# Patient Record
Sex: FEMALE | Race: WHITE | Employment: OTHER | URBAN - METROPOLITAN AREA
[De-identification: names, ages, dates, MRNs, and addresses within clinical notes are randomized per-mention and may not be internally consistent; named-entity substitution may affect disease eponyms.]

---

## 2017-01-09 ENCOUNTER — OFFICE VISIT - RIVER FALLS (OUTPATIENT)
Dept: FAMILY MEDICINE | Facility: CLINIC | Age: 67
End: 2017-01-09

## 2017-01-09 ASSESSMENT — MIFFLIN-ST. JEOR: SCORE: 1519.01

## 2017-03-08 ENCOUNTER — OFFICE VISIT - RIVER FALLS (OUTPATIENT)
Dept: FAMILY MEDICINE | Facility: CLINIC | Age: 67
End: 2017-03-08

## 2017-03-08 ASSESSMENT — MIFFLIN-ST. JEOR: SCORE: 1532.61

## 2017-03-27 ENCOUNTER — OFFICE VISIT - RIVER FALLS (OUTPATIENT)
Dept: FAMILY MEDICINE | Facility: CLINIC | Age: 67
End: 2017-03-27

## 2017-06-05 ENCOUNTER — COMMUNICATION - RIVER FALLS (OUTPATIENT)
Dept: FAMILY MEDICINE | Facility: CLINIC | Age: 67
End: 2017-06-05

## 2017-06-05 ENCOUNTER — OFFICE VISIT - RIVER FALLS (OUTPATIENT)
Dept: FAMILY MEDICINE | Facility: CLINIC | Age: 67
End: 2017-06-05

## 2017-06-05 ASSESSMENT — MIFFLIN-ST. JEOR: SCORE: 1551.66

## 2017-06-06 LAB
CREAT SERPL-MCNC: 0.85 MG/DL (ref 0.5–0.99)
GLUCOSE BLD-MCNC: 112 MG/DL (ref 65–99)

## 2017-10-19 ENCOUNTER — COMMUNICATION - RIVER FALLS (OUTPATIENT)
Dept: FAMILY MEDICINE | Facility: CLINIC | Age: 67
End: 2017-10-19

## 2018-01-15 ENCOUNTER — OFFICE VISIT - RIVER FALLS (OUTPATIENT)
Dept: FAMILY MEDICINE | Facility: CLINIC | Age: 68
End: 2018-01-15

## 2018-01-15 ASSESSMENT — MIFFLIN-ST. JEOR: SCORE: 1518.1

## 2018-01-16 LAB
CHOLEST SERPL-MCNC: 229 MG/DL
CHOLEST/HDLC SERPL: 4.4 {RATIO}
CREAT SERPL-MCNC: 0.66 MG/DL (ref 0.5–0.99)
GLUCOSE BLD-MCNC: 110 MG/DL (ref 65–99)
HDLC SERPL-MCNC: 52 MG/DL
LDLC SERPL CALC-MCNC: 150 MG/DL
NONHDLC SERPL-MCNC: 177 MG/DL
TRIGL SERPL-MCNC: 142 MG/DL

## 2018-05-07 ENCOUNTER — OFFICE VISIT - RIVER FALLS (OUTPATIENT)
Dept: FAMILY MEDICINE | Facility: CLINIC | Age: 68
End: 2018-05-07

## 2018-05-07 ASSESSMENT — MIFFLIN-ST. JEOR: SCORE: 1477.27

## 2018-09-04 ENCOUNTER — OFFICE VISIT - RIVER FALLS (OUTPATIENT)
Dept: FAMILY MEDICINE | Facility: CLINIC | Age: 68
End: 2018-09-04

## 2018-09-10 ENCOUNTER — OFFICE VISIT - RIVER FALLS (OUTPATIENT)
Dept: FAMILY MEDICINE | Facility: CLINIC | Age: 68
End: 2018-09-10

## 2018-09-10 ASSESSMENT — MIFFLIN-ST. JEOR: SCORE: 1503.58

## 2018-10-03 ENCOUNTER — OFFICE VISIT - RIVER FALLS (OUTPATIENT)
Dept: FAMILY MEDICINE | Facility: CLINIC | Age: 68
End: 2018-10-03

## 2018-10-03 ENCOUNTER — TRANSFERRED RECORDS (OUTPATIENT)
Dept: MULTI SPECIALTY CLINIC | Facility: CLINIC | Age: 68
End: 2018-10-03

## 2018-10-03 ASSESSMENT — MIFFLIN-ST. JEOR: SCORE: 1500.52

## 2018-10-04 LAB
CHOLEST SERPL-MCNC: 246 MG/DL
CHOLEST/HDLC SERPL: 4.3 {RATIO}
CREAT SERPL-MCNC: 0.65 MG/DL (ref 0.5–0.99)
GLUCOSE BLD-MCNC: 95 MG/DL (ref 65–99)
HDLC SERPL-MCNC: 57 MG/DL
LDLC SERPL CALC-MCNC: 160 MG/DL
NONHDLC SERPL-MCNC: 189 MG/DL
TRIGL SERPL-MCNC: 158 MG/DL

## 2018-10-16 ENCOUNTER — OFFICE VISIT - RIVER FALLS (OUTPATIENT)
Dept: FAMILY MEDICINE | Facility: CLINIC | Age: 68
End: 2018-10-16

## 2018-12-20 ENCOUNTER — OFFICE VISIT - RIVER FALLS (OUTPATIENT)
Dept: FAMILY MEDICINE | Facility: CLINIC | Age: 68
End: 2018-12-20

## 2019-02-27 ENCOUNTER — OFFICE VISIT - RIVER FALLS (OUTPATIENT)
Dept: FAMILY MEDICINE | Facility: CLINIC | Age: 69
End: 2019-02-27

## 2019-02-27 ASSESSMENT — MIFFLIN-ST. JEOR: SCORE: 1536.81

## 2019-03-13 ENCOUNTER — COMMUNICATION - RIVER FALLS (OUTPATIENT)
Dept: FAMILY MEDICINE | Facility: CLINIC | Age: 69
End: 2019-03-13

## 2019-03-13 ENCOUNTER — OFFICE VISIT - RIVER FALLS (OUTPATIENT)
Dept: FAMILY MEDICINE | Facility: CLINIC | Age: 69
End: 2019-03-13

## 2019-03-13 LAB
A/G RATIO - HISTORICAL: 1.6 (ref 1–2)
ALBUMIN SERPL-MCNC: 4.4 G/DL (ref 3.2–4.6)
ALP SERPL-CCNC: 93 IU/L (ref 50–136)
ALT SERPL W P-5'-P-CCNC: 25 IU/L (ref 8–45)
ANION GAP SERPL CALCULATED.3IONS-SCNC: 13 MMOL/L (ref 5–18)
AST SERPL W P-5'-P-CCNC: 19 IU/L (ref 2–40)
BILIRUB DIRECT SERPL-MCNC: 0.2 MG/DL (ref 0.1–0.5)
BILIRUB INDIRECT SERPL-MCNC: 0.3 MG/DL (ref 0.2–0.8)
BILIRUB SERPL-MCNC: 0.5 MG/DL (ref 0.2–1.2)
BNP SERPL-MCNC: <10 PG/ML
BUN SERPL-MCNC: 33 MG/DL (ref 8–25)
BUN/CREAT RATIO - HISTORICAL: 45 (ref 10–20)
CALCIUM SERPL-MCNC: 9.7 MG/DL (ref 8.5–10.5)
CHLORIDE BLD-SCNC: 101 MMOL/L (ref 98–110)
CO2 SERPL-SCNC: 26 MMOL/L (ref 21–31)
CREAT SERPL-MCNC: 0.73 MG/DL (ref 0.57–1.11)
ERYTHROCYTE [DISTWIDTH] IN BLOOD BY AUTOMATED COUNT: 17 %
GFR ESTIMATE EXT - HISTORICAL: >60
GLOBULIN: 2.8 G/DL (ref 2–3.7)
GLUCOSE BLD-MCNC: 136 MG/DL (ref 65–100)
HCT VFR BLD AUTO: 39.9 %
HGB BLD-MCNC: 12.1 G/DL
MCH RBC QN AUTO: 24.4 PG
MCHC RBC AUTO-ENTMCNC: 30.3 GM/DL
MCV RBC AUTO: 80.5 FL
PLATELET # BLD AUTO: 299 X10^3/UL
POTASSIUM BLD-SCNC: 4.4 MMOL/L (ref 3.5–5)
PROT SERPL-MCNC: 7.2 G/DL (ref 6–8)
RBC # BLD AUTO: 4.95 X10^6/UL
SODIUM SERPL-SCNC: 140 MMOL/L (ref 135–145)
WBC # BLD AUTO: 8.6 X10^3/UL

## 2019-03-13 ASSESSMENT — MIFFLIN-ST. JEOR: SCORE: 1525.01

## 2019-04-25 ENCOUNTER — OFFICE VISIT - RIVER FALLS (OUTPATIENT)
Dept: FAMILY MEDICINE | Facility: CLINIC | Age: 69
End: 2019-04-25

## 2019-04-25 ASSESSMENT — MIFFLIN-ST. JEOR: SCORE: 1527.74

## 2019-08-16 ENCOUNTER — OFFICE VISIT - RIVER FALLS (OUTPATIENT)
Dept: FAMILY MEDICINE | Facility: CLINIC | Age: 69
End: 2019-08-16

## 2019-08-16 ASSESSMENT — MIFFLIN-ST. JEOR: SCORE: 1506.87

## 2019-11-02 ENCOUNTER — HEALTH MAINTENANCE LETTER (OUTPATIENT)
Age: 69
End: 2019-11-02

## 2020-02-10 ENCOUNTER — HEALTH MAINTENANCE LETTER (OUTPATIENT)
Age: 70
End: 2020-02-10

## 2020-08-17 ENCOUNTER — OFFICE VISIT - RIVER FALLS (OUTPATIENT)
Dept: FAMILY MEDICINE | Facility: CLINIC | Age: 70
End: 2020-08-17

## 2020-11-14 ENCOUNTER — HEALTH MAINTENANCE LETTER (OUTPATIENT)
Age: 70
End: 2020-11-14

## 2021-03-30 ASSESSMENT — MIFFLIN-ST. JEOR: SCORE: 1491.12

## 2021-04-03 ENCOUNTER — HEALTH MAINTENANCE LETTER (OUTPATIENT)
Age: 71
End: 2021-04-03

## 2021-04-26 ENCOUNTER — OFFICE VISIT - RIVER FALLS (OUTPATIENT)
Dept: FAMILY MEDICINE | Facility: CLINIC | Age: 71
End: 2021-04-26

## 2021-09-12 ENCOUNTER — HEALTH MAINTENANCE LETTER (OUTPATIENT)
Age: 71
End: 2021-09-12

## 2021-11-07 ENCOUNTER — HEALTH MAINTENANCE LETTER (OUTPATIENT)
Age: 71
End: 2021-11-07

## 2022-02-11 VITALS
TEMPERATURE: 98 F | DIASTOLIC BLOOD PRESSURE: 78 MMHG | TEMPERATURE: 96.8 F | HEART RATE: 80 BPM | DIASTOLIC BLOOD PRESSURE: 74 MMHG | HEART RATE: 72 BPM | SYSTOLIC BLOOD PRESSURE: 116 MMHG | SYSTOLIC BLOOD PRESSURE: 124 MMHG | HEIGHT: 62 IN | WEIGHT: 236.4 LBS | BODY MASS INDEX: 43.5 KG/M2

## 2022-02-11 VITALS
HEIGHT: 61 IN | DIASTOLIC BLOOD PRESSURE: 86 MMHG | BODY MASS INDEX: 42.33 KG/M2 | SYSTOLIC BLOOD PRESSURE: 122 MMHG | HEART RATE: 74 BPM | OXYGEN SATURATION: 95 % | SYSTOLIC BLOOD PRESSURE: 126 MMHG | BODY MASS INDEX: 42.05 KG/M2 | HEART RATE: 68 BPM | HEART RATE: 104 BPM | WEIGHT: 226.2 LBS | BODY MASS INDEX: 43.46 KG/M2 | DIASTOLIC BLOOD PRESSURE: 70 MMHG | TEMPERATURE: 97.6 F | HEART RATE: 88 BPM | OXYGEN SATURATION: 95 % | WEIGHT: 232.8 LBS | HEIGHT: 62 IN | WEIGHT: 230 LBS | SYSTOLIC BLOOD PRESSURE: 142 MMHG | DIASTOLIC BLOOD PRESSURE: 82 MMHG | WEIGHT: 230.2 LBS | SYSTOLIC BLOOD PRESSURE: 110 MMHG | TEMPERATURE: 98 F | BODY MASS INDEX: 43.63 KG/M2 | TEMPERATURE: 97.3 F | DIASTOLIC BLOOD PRESSURE: 80 MMHG | TEMPERATURE: 99.1 F

## 2022-02-11 VITALS
WEIGHT: 238.2 LBS | HEART RATE: 66 BPM | DIASTOLIC BLOOD PRESSURE: 70 MMHG | SYSTOLIC BLOOD PRESSURE: 126 MMHG | HEIGHT: 61 IN | TEMPERATURE: 98.1 F | HEART RATE: 66 BPM | DIASTOLIC BLOOD PRESSURE: 80 MMHG | WEIGHT: 236.2 LBS | SYSTOLIC BLOOD PRESSURE: 122 MMHG | BODY MASS INDEX: 44.6 KG/M2 | HEIGHT: 61 IN | BODY MASS INDEX: 44.97 KG/M2 | BODY MASS INDEX: 44.48 KG/M2 | HEIGHT: 61 IN | SYSTOLIC BLOOD PRESSURE: 134 MMHG | DIASTOLIC BLOOD PRESSURE: 70 MMHG | WEIGHT: 235.6 LBS | HEART RATE: 80 BPM | TEMPERATURE: 97.3 F

## 2022-02-11 VITALS
TEMPERATURE: 98.4 F | DIASTOLIC BLOOD PRESSURE: 74 MMHG | HEART RATE: 88 BPM | HEIGHT: 62 IN | SYSTOLIC BLOOD PRESSURE: 138 MMHG | WEIGHT: 224.2 LBS | BODY MASS INDEX: 41.26 KG/M2

## 2022-02-11 VITALS
SYSTOLIC BLOOD PRESSURE: 144 MMHG | HEART RATE: 93 BPM | WEIGHT: 230.2 LBS | TEMPERATURE: 99.4 F | OXYGEN SATURATION: 97 % | BODY MASS INDEX: 42.79 KG/M2 | DIASTOLIC BLOOD PRESSURE: 74 MMHG

## 2022-02-11 VITALS
HEART RATE: 68 BPM | SYSTOLIC BLOOD PRESSURE: 124 MMHG | BODY MASS INDEX: 42.91 KG/M2 | TEMPERATURE: 97.2 F | HEIGHT: 62 IN | WEIGHT: 233.2 LBS | DIASTOLIC BLOOD PRESSURE: 72 MMHG

## 2022-02-11 VITALS
DIASTOLIC BLOOD PRESSURE: 74 MMHG | HEIGHT: 61 IN | SYSTOLIC BLOOD PRESSURE: 124 MMHG | BODY MASS INDEX: 43.23 KG/M2 | WEIGHT: 229 LBS

## 2022-02-11 VITALS
DIASTOLIC BLOOD PRESSURE: 70 MMHG | OXYGEN SATURATION: 95 % | BODY MASS INDEX: 43.73 KG/M2 | HEIGHT: 61 IN | WEIGHT: 231.6 LBS | HEART RATE: 87 BPM | SYSTOLIC BLOOD PRESSURE: 132 MMHG

## 2022-02-11 VITALS
SYSTOLIC BLOOD PRESSURE: 120 MMHG | WEIGHT: 240.6 LBS | HEIGHT: 62 IN | BODY MASS INDEX: 44.27 KG/M2 | HEART RATE: 60 BPM | DIASTOLIC BLOOD PRESSURE: 64 MMHG | TEMPERATURE: 97.5 F | OXYGEN SATURATION: 96 %

## 2022-02-11 VITALS
WEIGHT: 233.4 LBS | HEART RATE: 88 BPM | HEIGHT: 62 IN | TEMPERATURE: 97.5 F | SYSTOLIC BLOOD PRESSURE: 136 MMHG | DIASTOLIC BLOOD PRESSURE: 84 MMHG | BODY MASS INDEX: 42.95 KG/M2

## 2022-02-16 NOTE — PROGRESS NOTES
Patient:   CARLINE SINGH            MRN: [id number]            FIN: [id number]               Age:   68 years     Sex:  Female     :  1950   Associated Diagnoses:   Recurrent maxillary sinusitis   Author:   Joanie DAUGHERTY, Renaldo      Report Summary   Diagnosis  Recurrent maxillary sinusitis (OCJ23-RE J01.01).  Patient InstructionsOrders   Visit Information      Primary Care Provider (PCP):  Kt Seo MD    NPI# [id number]   Visit type:  General concerns.    Accompanied by:  No one.    Source of history:  Self, Medical record.    History limitation:  None.       Chief Complaint   2018 11:02 AM CST  deep cough, chest congestion, feverish, facial pressure/pain x 1 week; taking OTC cough syrup        History of Present Illness   Patient with left ear discomfort and fullness. Also thick green drainage. Cough, congestion. Head feels full but no specific area of sinus pressure. No fever. Being treated for vertigo.             The patient presents with sinus problem.  The sinus problem is located in the maxillary sinus.  The sinus problem is characterized by nasal congestion, rhinorrhea and facial pain.  The severity of the sinus problem is moderate.  The sinus problem is episodic, fluctuates in intensity and is worsening.  The sinus problem has lasted for 2 week(s).  The context of the sinus problem: occurred in association with illness.        Review of Systems   Constitutional:  Negative.    Eye:  Negative.    Ear/Nose/Mouth/Throat:  Negative except as documented in history of present illness.    Respiratory:  Cough, Sputum production, No hemoptysis.       Health Status   Allergies:    Allergic Reactions (All)  Severity Not Documented  Ciprofloxacin (No reactions were documented)  Penicillin (No reactions were documented)   Medications:  (Selected)   Prescriptions  Prescribed  Replacement CPAP +12cm H2o: Replacement CPAP +12cm H2o, See Instructions, Instructions: CPAP machine, humidifier, heated tubing,  filters, nasal or full face mask with cushion replacment, Supply, # 1 EA, 0 Refill(s), Type: Maintenance  buPROPion 150 mg/24 hours (XL) oral tablet, extended release: 1 tab(s) ( 150 mg ), po, q 24 hrs, # 90 tab(s), 3 Refill(s), Type: Maintenance, Pharmacy: [id number], 1 tab(s) Oral q 24 hrs  cefdinir 300 mg oral capsule: = 1 cap(s) ( 300 mg ), PO, q12hr, x 10 day(s), # 20 cap(s), 0 Refill(s), Type: Acute, Pharmacy: [facility] #[id number], 1 cap(s) Oral q12 hrs,x10 day(s)  dilTIAZem 240 mg/24 hours oral capsule, extended release: = 1 cap(s) ( 240 mg ), PO, Daily, # 90 cap(s), 3 Refill(s), Type: Maintenance, Pharmacy: [facility] #[id number], 1 cap(s) Oral daily  furosemide 40 mg oral tablet: 1 tab(s) ( 40 mg ), PO, Daily, PRN: as needed, # 90 tab(s), 1 Refill(s), Type: Maintenance, Pharmacy: [facility] [id number], 1 tab(s) po daily,PRN:as needed  ibuprofen 600 mg oral tablet: See Instructions, Instructions: TAKE ONE TABLET BY MOUTH EVERY EIGHT HOURS AS NEEDED FOR PAIN, # 90 tab(s), 2 Refill(s), Type: Soft Stop, Pharmacy: [facility] #[id number], TAKE ONE TABLET BY MOUTH EVERY EIGHT HOURS AS NEEDED FOR PAIN  losartan 100 mg oral tablet: 0.5 tab, po, daily, # 90 tab(s), 3 Refill(s), Type: Maintenance, Pharmacy: [id number]  omeprazole 20 mg oral delayed release capsule: = 1 cap(s) ( 20 mg ), po, daily, # 90 cap(s), 3 Refill(s), Type: Maintenance, Pharmacy: [id number], 1 cap(s) Oral daily  Documented Medications  Documented  PreserVision: po, bid, 0 Refill(s), Type: Maintenance  Vitamin B Complex 100: 1tab, daily, 0 Refill(s), Type: Maintenance  Vitamin D3 1000 intl units oral capsule: 1 cap(s) ( 1,000 International Unit ), po, daily, 0 Refill(s), Type: Maintenance  aspirin 81 mg oral tablet: 1 tab(s) ( 81 mg ), PO, Daily, # 90 tab(s), 0 Refill(s), Type: Maintenance  turmeric: ( 500 mg ), daily, 0 Refill(s), Type: Maintenance   Problem list:    All Problems  Morbid obesity / SNOMED CT  631407644 / Probable  Moderate major depression / SNOMED CT [id number] / Confirmed  Hypertension / SNOMED CT 1748449325 / Confirmed  Hypercholesteremia / SNOMED CT 104859775 / Confirmed  Changing skin lesion / SNOMED CT 5420650431 / Confirmed  Resolved: Sleep apnea / SNOMED CT 724490826  Resolved: Pregnancy / SNOMED CT 109940511      Histories   Past Medical History:    Active  Hypertension (6049658170)  Hypercholesteremia (097251605)  Morbid obesity (495349498)  Moderate major depression ([id number])  Changing skin lesion (8651019273)  Resolved  Sleep apnea (139795414):  Resolved on 3/8/2017 at 66 years.  Pregnancy (547420340):  Resolved on 1983 at 33 years.   Family History:    MI  Mother  Father  Melanoma  Mother  Sister  Diabetes mellitus  Brother (Eliud)  CA - Cancer of colon  Uncle (M)  CAD - Coronary artery disease  Mother  Father     Procedure history:    Polysomnogram-Titration Study on 3/31/2015 at 64 Years.  Comments:  2015 7:19 PM CDT - Iyer Lesli WATT  CPAP titrated to +11cm H2O which yielded AHI of 2.3/hr and eliminated snoring  Colonoscopy (589673936) on 2013 at 62 Years.  Comments:  2015 8:51 AM Isatu Forrester  Repeat in 10 years.  Hx of removal of cyst (V15.29) on 2011 at 60 Years.  Colonoscopy (269387405) on 2008 at 58 Years.  Colonoscopy and biopsy of colon (8048193441) on 2004 at 54 Years.  Total left knee arthroplasty in the month of 2004 at 53 Years.  Excision of corneal lesion (618150791) on 10/4/1999 at 49 Years.  Comments:  2015 8:55 AM Isatu Forrester  Left.  Left knee debridement in  at 47 Years.  Incidental appendectomy (628388816) in  at 42 Years.  D&C - Dilatation and curettage (0244662953) in  at 35 Years.  Hysterectomy (763035531) in  at 33 Years.   section (43285440) in  at 33 Years.  Tonsillectomy (945504001).   Social History:        Alcohol  Assessment            Past                     Comments:                      03/08/2017 - Penny ZAVALAHanna                     Denies alcohol use.      Tobacco Assessment: Denies Tobacco Use            Never      Substance Abuse Assessment: Denies Substance Abuse            Never      Employment and Education Assessment            Retired      Home and Environment Assessment            Marital status:  (Living together).  Spouse/Partner name: Marvel.  Lives with Spouse.  4 children.               Living situation: Home/Independent.      Nutrition and Health Assessment            Type of diet: Weight Watchers.      Exercise and Physical Activity Assessment: Regular exercise            Exercise frequency: 3-4 times/week.  Exercise type: Water aerobics.      Sexual Assessment            Sexually active: Yes.  Sexual orientation: Heterosexual.  Other contraceptive use: Hysterectomy.      Other Assessment            First menses age 13.  Regular menses.                     Comments:                      02/26/2015 - Michael Isatu                     Wears glasses.      Physical Examination   Vital Signs   12/20/2018 11:02 AM CST Temperature Tympanic 99.4 DegF    Peripheral Pulse Rate 93 bpm    HR Method Electronic    Systolic Blood Pressure 144 mmHg  HI    Diastolic Blood Pressure 74 mmHg    Mean Arterial Pressure 97 mmHg    BP Site Right arm    BP Method Manual    Oxygen Saturation 97 %      Measurements from flowsheet : Measurements   12/20/2018 11:02 AM CST  Weight Measured - Standard                230.2 lb     General:  Alert and oriented, No acute distress.    Eye:  Pupils are equal, round and reactive to light, Normal conjunctiva.    HENT:  Normocephalic, Oral mucosa is moist, No pharyngeal erythema, No sinus tenderness, TMs dull bilaterally, canals are clear.         Nose: Both nostrils, Discharge ( Moderate amount, Green ).    Neck:  Supple, Non-tender, No lymphadenopathy.    Respiratory:  Lungs are  clear to auscultation, Respirations are non-labored.    Cardiovascular:  Normal rate, Regular rhythm.       Impression and Plan   Diagnosis     Recurrent maxillary sinusitis (HZO28-ET J01.01).     Patient Instructions:       Counseled: Patient, Regarding diagnosis, Regarding medications, Activity, Verbalized understanding.    Orders     Orders (Selected)   Prescriptions  Prescribed  cefdinir 300 mg oral capsule: = 1 cap(s) ( 300 mg ), PO, q12hr, x 10 day(s), # 20 cap(s), 0 Refill(s), Type: Acute, Pharmacy: [id number], 1 cap(s) Oral q12 hrs,x10 day(s).     Take medicine as prescribed, side effects discussed.  Tylenol/ibuprofen for fever and discomfort.  Push fluids.  RTC if not improving in 36-48 hours, prior if concerns as we have discussed.

## 2022-02-16 NOTE — TELEPHONE ENCOUNTER
---------------------  From: Kt Seo MD   Sent: 6/6/2019 4:21:33 PM CDT  Subject: CT follow up     ** Submitted: **  Order:Referral (Request)  Details:  6/6/2019 4:20 PM CDT, Referred to: General Surgery, Referred to: [facility], Diverticulitis         Signed by Kt Seo MD  6/6/2019 4:20:00 PM    ** Submitted: **  Order:sulfamethoxazole-trimethoprim (Bactrim  mg-160 mg oral tablet)  1 tab(s)  PO  BID  Qty:  20 tab(s)        Duration:  10 day(s)        Refills:  0          Substitutions Allowed     Route To [email or web address]    Signed by Kt Seo MD  6/6/2019 4:18:00 PM    ** Submitted: **  Order:metroNIDAZOLE (metroNIDAZOLE 500 mg oral tablet)  1 tab(s)  PO  q8hr  Qty:  30 tab(s)        Duration:  10 day(s)        Refills:  0          Substitutions Allowed     Route To [email or web address]    Signed by Kt Seo MD  6/6/2019 4:18:00 PM    Called patient regarding her CT results. Shows acute on chronic diverticulitis. Consider surgery consult.   Also reviewed ground glass nodule. No further follow up needed.

## 2022-02-16 NOTE — TELEPHONE ENCOUNTER
---------------------  From: Shameka Perkins MA (Phone [facility] ([id number])   To: Kt Seo MD;     Sent: 5/9/2019 10:49:21 AM CDT  Subject: Phone Note: Rash     PCP:   [facility]      Time of Call:  10:45am       Person Calling:  Dianna  Phone number:  [phone]    Returned call at: _    Note:   Patient called stating she noticed a red, itchy, smelly oozing, sore spot under her stomach fold, states it 10 to 12inches wide. Did put cornstarch on it this morning, patient is wondering if there is something you can send it to help take care of it. States she does not think she needs to be seen for it.     Pharmacy: [facility]    Last office visit and reason:  4/25/19    Transferred to: [facility]  ** Submitted: **  Order:nystatin topical (nystatin 100,000 units/g topical powder)  1 deep  TOP  TID  apply to affected area until healed  Qty:  30 g        Refills:  0          Substitutions Allowed     [id number]    Signed by Kt Seo MD  5/9/2019 11:21:00 AM---------------------  From: Kt Seo MD   To: Phone Messages Pool ([id number]_[facility]);     Sent: 5/9/2019 11:23:38 AM CDT  Subject: RE: Phone Note: Rash     If not effective, should be seen.---------------------  From: Shameka Perkins MA (Phone [facility] ([id number]_[facility]))   To: Kt Seo MD;     Sent: 5/9/2019 11:36:38 AM CDT  Subject: RE: Phone Note: Rash     Patient is wondering if there is something she could by to put in there to help it as well, like gauze, washcloth, paper towel?OK to try those. Need to make sure that if they get moist that she switches them out. A towel or washcloth might absorb the most without increasing the dampness.---------------------  From: Kt Seo MD   To: Phone Messages Pool ([id number]_[place]);     Sent: 5/9/2019 11:44:21 AM CDT  Subject: RE: Phone Note: RashCalled and let patient know

## 2022-02-16 NOTE — TELEPHONE ENCOUNTER
Entered by Jihan Olivarez CMA on March 14, 2019 8:42:13 AM CDT  Returned Call  Time: 8:41 am  Note:  Called [facility] medical records and asked them to fax a copy of the report of us as soon as possible.      ---------------------  From: Kt Seo MD   To: Phone Messages Pool ([id number]_[place] [facility] [place]);     Sent: 3/13/2019 10:02:38 PM CDT  Subject: ultrasound     For Thursday morning first thing - please contact [facility] radiology. Patient was sent there for stat ultrasound and results were not received. Thanks

## 2022-02-16 NOTE — NURSING NOTE
Pt called at 1205 pm requesting an rx for antibiotics for a sinus infection.  Left message at 135 pm pt would need to be seen and can call to schedule an appt.

## 2022-02-16 NOTE — NURSING NOTE
"Comprehensive Intake Entered On:  4/26/2021 12:58 PM CDT    Performed On:  4/26/2021 12:50 PM CDT by Cornell Rai CMA               Summary   Chief Complaint :   Verbal consent given for a telephone visit.  Pt is c/o cough,sore throat and runny nose x 2-3 days  Pt alos c/o Right leg edema and pain on and off x \"several months\"   Menstrual Status :   Hysterectomy   Cornell Rai CMA - 4/26/2021 12:50 PM CDT   Health Status   Allergies Verified? :   Yes   Medication History Verified? :   Yes   Immunizations Current :   Yes   Medical History Verified? :   Yes   Pre-Visit Planning Status :   Not completed   Tobacco Use? :   Never smoker   Cornell Rai CMA - 4/26/2021 12:50 PM CDT   Meds / Allergies   (As Of: 4/26/2021 12:58:06 PM CDT)   Allergies (Active)   ciprofloxacin  Estimated Onset Date:   Unspecified ; Created By:   Isatu Rodriguez; Reaction Status:   Active ; Category:   Drug ; Substance:   ciprofloxacin ; Type:   Allergy ; Updated By:   Isatu Rodriguez; Reviewed Date:   4/26/2021 12:57 PM CDT      penicillin  Estimated Onset Date:   Unspecified ; Created By:   Hanna Francis LPN; Reaction Status:   Active ; Category:   Drug ; Substance:   penicillin ; Type:   Allergy ; Updated By:   Hanna Francis LPN; Reviewed Date:   4/26/2021 12:57 PM CDT        Medication List   (As Of: 4/26/2021 12:58:06 PM CDT)   Prescription/Discharge Order    sulfamethoxazole-trimethoprim  :   sulfamethoxazole-trimethoprim ; Status:   Processing ; Ordered As Mnemonic:   Bactrim  mg-160 mg oral tablet ; Ordering Provider:   Kt Seo MD; Action Display:   Complete ; Catalog Code:   sulfamethoxazole-trimethoprim ; Order Dt/Tm:   4/26/2021 12:53:50 PM CDT          metroNIDAZOLE  :   metroNIDAZOLE ; Status:   Processing ; Ordered As Mnemonic:   metroNIDAZOLE 500 mg oral tablet ; Ordering Provider:   Kt Seo MD; Action Display:   Complete ; Catalog Code:   metroNIDAZOLE ; Order Dt/Tm:   4/26/2021 12:54:00 PM CDT    "       Misc Prescription  :   Misc Prescription ; Status:   Processing ; Ordered As Mnemonic:   IBUPROFEN 600MG TABLETS ; Ordering Provider:   Kt Seo MD; Action Display:   Complete ; Catalog Code:   Miscellaneous Prescription ; Order Dt/Tm:   4/26/2021 12:55:21 PM CDT          metroNIDAZOLE  :   metroNIDAZOLE ; Status:   Processing ; Ordered As Mnemonic:   metroNIDAZOLE 500 mg oral tablet ; Ordering Provider:   Kt Seo MD; Action Display:   Complete ; Catalog Code:   metroNIDAZOLE ; Order Dt/Tm:   4/26/2021 12:55:25 PM CDT          furosemide  :   furosemide ; Status:   Prescribed ; Ordered As Mnemonic:   furosemide 40 mg oral tablet ; Simple Display Line:   40 mg, 1 tab(s), PO, Daily, PRN: as needed, 90 tab(s), 1 Refill(s) ; Ordering Provider:   Kt Seo MD; Catalog Code:   furosemide ; Order Dt/Tm:   2/27/2019 9:52:44 AM CST          ibuprofen 600 mg oral tablet  :   ibuprofen 600 mg oral tablet ; Status:   Prescribed ; Ordered As Mnemonic:   ibuprofen 600 mg oral tablet ; Simple Display Line:   1 tab(s), Oral, q8 hrs, PRN: AS NEEDED FOR PAIN, 90 tab(s), 2 Refill(s) ; Ordering Provider:   Kt Seo MD; Catalog Code:   ibuprofen ; Order Dt/Tm:   7/8/2019 9:41:55 AM CDT          nystatin topical  :   nystatin topical ; Status:   Prescribed ; Ordered As Mnemonic:   nystatin 100,000 units/g topical powder ; Simple Display Line:   1 deep, TOP, TID, apply to affected area until healed, 30 g, 5 Refill(s) ; Ordering Provider:   Kt Seo MD; Catalog Code:   nystatin topical ; Order Dt/Tm:   6/3/2019 1:08:57 PM CDT          omeprazole  :   omeprazole ; Status:   Prescribed ; Ordered As Mnemonic:   omeprazole 20 mg oral delayed release capsule ; Simple Display Line:   20 mg, 1 cap(s), po, daily, 90 cap(s), 3 Refill(s) ; Ordering Provider:   Kt Seo MD; Catalog Code:   omeprazole ; Order Dt/Tm:   2/27/2019 9:52:46 AM CST          dilTIAZem  :   dilTIAZem ; Status:   Prescribed  ; Ordered As Mnemonic:   dilTIAZem 240 mg/24 hours oral capsule, extended release ; Simple Display Line:   240 mg, 1 cap(s), PO, Daily, 90 cap(s), 3 Refill(s) ; Ordering Provider:   Kt Seo MD; Catalog Code:   dilTIAZem ; Order Dt/Tm:   2/27/2019 9:52:45 AM CST          losartan  :   losartan ; Status:   Prescribed ; Ordered As Mnemonic:   losartan 100 mg oral tablet ; Simple Display Line:   0.5 tab, po, daily, 90 tab(s), 3 Refill(s) ; Ordering Provider:   Kt Seo MD; Catalog Code:   losartan ; Order Dt/Tm:   2/27/2019 9:52:45 AM CST          Miscellaneous Rx Supply  :   Miscellaneous Rx Supply ; Status:   Prescribed ; Ordered As Mnemonic:   Replacement CPAP +12cm H2o ; Simple Display Line:   See Instructions, CPAP machine, humidifier, heated tubing, filters, nasal or full face mask with cushion replacment, 1 EA ; Ordering Provider:   Kt Seo MD; Catalog Code:   Miscellaneous Rx Supply ; Order Dt/Tm:   4/16/2015 11:14:19 AM CDT ; Comment:   Months = 99 (Lifetime)            Home Meds    mometasone topical  :   mometasone topical ; Status:   Processing ; Ordered As Mnemonic:   mometasone 0.1% topical cream ; Action Display:   Complete ; Catalog Code:   mometasone topical ; Order Dt/Tm:   4/26/2021 12:56:54 PM CDT          turmeric  :   turmeric ; Status:   Documented ; Ordered As Mnemonic:   turmeric ; Simple Display Line:   500 mg, daily, 0 Refill(s) ; Catalog Code:   turmeric ; Order Dt/Tm:   11/29/2016 12:34:06 PM CST          cholecalciferol  :   cholecalciferol ; Status:   Documented ; Ordered As Mnemonic:   Vitamin D3 1000 intl units oral capsule ; Simple Display Line:   1,000 International Unit, 1 cap(s), po, daily, 0 Refill(s) ; Catalog Code:   cholecalciferol ; Order Dt/Tm:   11/29/2016 12:33:13 PM CST          multivitamin with minerals  :   multivitamin with minerals ; Status:   Documented ; Ordered As Mnemonic:   PreserVision ; Simple Display Line:   po, bid, 0 Refill(s) ;  Catalog Code:   multivitamin with minerals ; Order Dt/Tm:   11/30/2015 9:29:25 AM CST          aspirin  :   aspirin ; Status:   Documented ; Ordered As Mnemonic:   aspirin 81 mg oral tablet ; Simple Display Line:   81 mg, 1 tab(s), PO, Daily, 90 tab(s) ; Catalog Code:   aspirin ; Order Dt/Tm:   2/18/2015 10:25:30 AM CST          multivitamin  :   multivitamin ; Status:   Documented ; Ordered As Mnemonic:   Vitamin B Complex 100 ; Simple Display Line:   1tab, daily ; Catalog Code:   multivitamin ; Order Dt/Tm:   2/18/2015 10:23:40 AM CST            ID Risk Screen   Recent Travel History :   No recent travel   Family Member Travel History :   No recent travel   Other Exposure to Infectious Disease :   Unknown   COVID-19 Testing Status :   No COVID-19 test performed   Cornell Rai CMA - 4/26/2021 12:50 PM CDT   Social History   Social History   (As Of: 4/26/2021 12:58:06 PM CDT)   Alcohol:        Past   Comments:  3/8/2017 10:32 AM - Hanna Francis LPN: Denies alcohol use.   (Last Updated: 3/8/2017 10:32:10 AM CST by Hanna Francis LPN)          Tobacco:  Denies Tobacco Use      Never   (Last Updated: 2/18/2015 10:26:14 AM CST by Hanna Francis LPN)   Never (less than 100 in lifetime)   (Last Updated: 4/26/2021 12:53:33 PM CDT by Cornell Rai CMA)          Electronic Cigarette/Vaping:        Electronic Cigarette Use: Never.   (Last Updated: 4/26/2021 12:53:37 PM CDT by Cornell Rai CMA)          Substance Abuse:  Denies Substance Abuse      Never   (Last Updated: 2/26/2015 1:35:07 PM CST by Isatu Rodriguez)          Employment/School:        Retired   (Last Updated: 10/16/2018 1:19:36 PM CDT by Isatu Rodriguez)          Home/Environment:        Marital status:  (Living together).  Spouse/Partner name: Marvel.  Lives with Spouse.  4 children.  Living situation: Home/Independent.   (Last Updated: 2/18/2015 10:27:10 AM CST by Hanna Francis LPN)          Nutrition/Health:        Type of diet: Weight Watchers.    (Last Updated: 10/16/2018 1:19:57 PM CDT by Isatu Rodriguez)          Exercise:  Regular exercise      Exercise frequency: 3-4 times/week.  Exercise type: Water aerobics.   (Last Updated: 2/26/2015 1:36:06 PM CST by Isatu Rodriguez)          Sexual:        Sexually active: Yes.  Sexual orientation: Heterosexual.  Other contraceptive use: Hysterectomy.   (Last Updated: 2/26/2015 1:35:37 PM CST by Isatu Rodriguez)          Other:        First menses age 13.  Regular menses.   Comments:  2/26/2015 1:34 PM - Isatu Rodriguez: Wears glasses.   (Last Updated: 2/26/2015 1:34:52 PM CST by Isatu Rodriguez)

## 2022-02-16 NOTE — PROGRESS NOTES
Patient:   CARLINE SINGH            MRN: [id number]            FIN: [id number]               Age:   68 years     Sex:  Female     :  1950   Associated Diagnoses:   Acute recurrent maxillary sinusitis; Mucopurulent conjunctivitis of right eye   Author:   Kt Seo MD      Chief Complaint   2019 9:26 AM CDT    right eye has been mattery; possible sinus infection/allergies; sore throat; congestion; cough; denies fever      History of Present Illness   Chief complaint and symptoms reviewed with patient and confirmed as above.  One week of increasing nasal congestion, thick cough with purulent production, feels tired, sore throat  initially had some allergy symptoms, now over past week more sinus pressure  past 2 days right eye has been red with mattery drainage      Health Status   Allergies:    Allergic Reactions (All)  Severity Not Documented  Ciprofloxacin (No reactions were documented)  Penicillin (No reactions were documented)   Medications:  (Selected)   Prescriptions  Prescribed  IBUPROFEN 600MG TABLETS: 1 tab(s), Oral, q8 hrs, PRN: AS NEEDED FOR PAIN, # 90 tab(s), Type: Soft Stop, Pharmacy: [id number]  Replacement CPAP +12cm H2o: Replacement CPAP +12cm H2o, See Instructions, Instructions: CPAP machine, humidifier, heated tubing, filters, nasal or full face mask with cushion replacment, Supply, # 1 EA, 0 Refill(s), Type: Maintenance  Tessalon Perles 100 mg oral capsule: = 2 cap(s) ( 200 mg ), PO, TID, # 42 cap(s), 0 Refill(s), Type: Maintenance, Pharmacy: [id number], 2 cap(s) Oral tid,x7 day(s)  buPROPion 150 mg/24 hours (XL) oral tablet, extended release: 1 tab(s) ( 150 mg ), po, q 24 hrs, # 90 tab(s), 3 Refill(s), Type: Maintenance, Pharmacy: [id number], 1 tab(s) Oral q 24 hrs  dilTIAZem 240 mg/24 hours oral capsule, extended release: = 1 cap(s) ( 240 mg ), PO, Daily, # 90 cap(s), 3 Refill(s), Type: Maintenance, Pharmacy: [id number], 1  cap(s) Oral daily  furosemide 40 mg oral tablet: = 1 tab(s) ( 40 mg ), PO, Daily, PRN: as needed, # 90 tab(s), 1 Refill(s), Type: Maintenance, Pharmacy: [id number], 1 tab(s) Oral daily,PRN:as needed  ibuprofen 600 mg oral tablet: See Instructions, Instructions: TAKE ONE TABLET BY MOUTH EVERY EIGHT HOURS AS NEEDED FOR PAIN, # 90 tab(s), 2 Refill(s), Type: Soft Stop, Pharmacy: [facility] #[id number], TAKE ONE TABLET BY MOUTH EVERY EIGHT HOURS AS NEEDED FOR PAIN  losartan 100 mg oral tablet: 0.5 tab, po, daily, # 90 tab(s), 3 Refill(s), Type: Maintenance, Pharmacy: [email or web address], 0.5 tab Oral daily  metroNIDAZOLE 500 mg oral tablet: = 1 tab(s) ( 500 mg ), PO, q8hr, # 30 tab(s), 0 Refill(s), Type: Maintenance, Pharmacy: [email or web address], 1 tab(s) Oral q8 hrs,x10 day(s)  omeprazole 20 mg oral delayed release capsule: = 1 cap(s) ( 20 mg ), po, daily, # 90 cap(s), 3 Refill(s), Type: Maintenance, Pharmacy: [email or web address] [id number], 1 cap(s) Oral daily  Documented Medications  Documented  PreserVision: po, bid, 0 Refill(s), Type: Maintenance  Vitamin B Complex 100: 1tab, daily, 0 Refill(s), Type: Maintenance  Vitamin D3 1000 intl units oral capsule: 1 cap(s) ( 1,000 International Unit ), po, daily, 0 Refill(s), Type: Maintenance  aspirin 81 mg oral tablet: 1 tab(s) ( 81 mg ), PO, Daily, # 90 tab(s), 0 Refill(s), Type: Maintenance  mometasone 0.1% topical cream: Topical, daily, 0 Refill(s), Type: Maintenance  turmeric: ( 500 mg ), daily, 0 Refill(s), Type: Maintenance   Problem list:    All Problems (Selected)  Changing skin lesion / SNOMED CT 8164716467 / Confirmed  Hypertension / SNOMED CT 5236040124 / Confirmed  Moderate major depression / SNOMED CT [id number] / Confirmed  Morbid obesity / SNOMED CT 357031472 / Probable  Hypercholesteremia / SNOMED CT 233437442 / Confirmed      Histories   Past Medical History:    Active  Hypertension (SNOMED CT  6441866096)  Hypercholesteremia (SNOMED CT 425099472)  Morbid obesity (SNOMED CT 393448557)  Moderate major depression (SNOMED CT [id number])  Changing skin lesion (SNOMED CT 4822334565)  Resolved  Sleep apnea (SNOMED CT 021971949):  Resolved on 3/8/2017 at 66 years.  Pregnancy (SNOMED CT 751904391):  Resolved on 1983 at 33 years.   Family History:    MI  Mother  Father  Melanoma  Mother  Sister  Diabetes mellitus  Brother (Eliud)  CA - Cancer of colon  Uncle (M)  CAD - Coronary artery disease  Mother  Father     Procedure history:    Polysomnogram-Titration Study on 3/31/2015 at 64 Years.  Comments:  2015 7:19 PM CDT - Lesli Iyer CMA  CPAP titrated to +11cm H2O which yielded AHI of 2.3/hr and eliminated snoring  Colonoscopy (394819003) on 2013 at 62 Years.  Comments:  2015 8:51 AM Isatu Forrester  Repeat in 10 years.  Hx of removal of cyst (V15.29) on 2011 at 60 Years.  Colonoscopy (924448390) on 2008 at 58 Years.  Colonoscopy and biopsy of colon (9789046610) on 2004 at 54 Years.  Total left knee arthroplasty in the month of 2004 at 53 Years.  Excision of corneal lesion (139870056) on 10/4/1999 at 49 Years.  Comments:  2015 8:55 AM Isatu Forrester  Left.  Left knee debridement in  at 47 Years.  Incidental appendectomy (946337667) in  at 42 Years.  D&C - Dilatation and curettage (0936180479) in  at 35 Years.  Hysterectomy (100881090) in  at 33 Years.   section (94612452) in  at 33 Years.  Tonsillectomy (836391611).   Social History:        Alcohol Assessment            Past                     Comments:                      2017 - Hanna Francis LPN                     Denies alcohol use.      Tobacco Assessment: Denies Tobacco Use            Never      Substance Abuse Assessment: Denies Substance Abuse            Never      Employment and Education Assessment            Retired      Home and  Environment Assessment            Marital status:  (Living together).  Spouse/Partner name: Marvel.  Lives with Spouse.  4 children.               Living situation: Home/Independent.      Nutrition and Health Assessment            Type of diet: Weight Watchers.      Exercise and Physical Activity Assessment: Regular exercise            Exercise frequency: 3-4 times/week.  Exercise type: Water aerobics.      Sexual Assessment            Sexually active: Yes.  Sexual orientation: Heterosexual.  Other contraceptive use: Hysterectomy.      Other Assessment            First menses age 13.  Regular menses.                     Comments:                      02/26/2015 - Michael Isatu                     Wears glasses.      Physical Examination   Vital Signs   4/25/2019 9:26 AM CDT Temperature Tympanic 97.3 DegF  LOW    Peripheral Pulse Rate 66 bpm    HR Method Manual    Systolic Blood Pressure 122 mmHg    Diastolic Blood Pressure 70 mmHg    Mean Arterial Pressure 87 mmHg    BP Method Manual      Measurements from flowsheet : Measurements   4/25/2019 9:26 AM CDT Height Measured - Standard 61.25 in    Weight Measured - Standard 236.2 lb    BSA 2.15 m2    Body Mass Index 44.26 kg/m2  HI      General:  Alert and oriented, No acute distress.    Eye:  Pupils are equal, round and reactive to light, right eye is injected, mattery drainage along lash line.    HENT:  Normocephalic, Tympanic membranes are clear, Oral mucosa is moist, No pharyngeal erythema.         Sinus: Bilateral, Maxillary sinus, Tenderness.    Neck:  Supple, Non-tender.    Respiratory:  Lungs are clear to auscultation.    Cardiovascular:  Normal rate, Regular rhythm.       Impression and Plan   Diagnosis     Acute recurrent maxillary sinusitis (BNC95-NR J01.01).     Mucopurulent conjunctivitis of right eye (EIT85-OH H10.021).     Plan:  Signs and symptoms and over the counter treatment of congestion discussed.  Should resolve over 5-7 days from start of  antibiotic.  Return to clinic if severe headache, difficulty swallowing, chest pain, or if symptoms become more severe or any other concerns.  Call with questions..    Orders     Orders   Pharmacy:  moxifloxacin 0.5% ophthalmic solution (Prescribe): 1 drop(s), Eye-Right, bid, # 3 mL, 0 Refill(s), Type: Acute, Pharmacy: [id number], 1 drop(s) Eye-Right bid  Bactrim  mg-160 mg oral tablet (Prescribe): 1 tab(s), PO, BID, x 10 day(s), # 20 tab(s), 0 Refill(s), Type: Maintenance, Pharmacy: [facility] [id number], 1 tab(s) Oral bid,x10 day(s).

## 2022-02-16 NOTE — NURSING NOTE
Depression Screening Entered On:  2/27/2019 10:08 AM CST    Performed On:  2/27/2019 10:08 AM CST by Tracy Stafford CMA               Depression Screening   Feeling Down, Depressed, Hopeless :   Not at all   Little Interest - Pleasure in Activities :   Not at all   Initial Depression Screen Score :   0    Trouble Falling or Staying Asleep :   Not at all   Feeling Tired or Little Energy :   Several days   Poor Appetite or Overeating :   Several days   Feeling Bad About Yourself :   Not at all   Trouble Concentrating :   Several days   Thoughts Better Off Dead or Hurting Self :   Not at all   EDYTA Difficulty with Work, Home, Others :   Not difficult at all   Tracy Stafford CMA - 2/27/2019 10:08 AM CST

## 2022-02-16 NOTE — NURSING NOTE
Comprehensive Intake Entered On:  8/17/2020 2:20 PM CDT    Performed On:  8/17/2020 2:16 PM CDT by Tracy Stafford CMA   Chief Complaint :   phone visit: possible sinus, sore throat, congestion, runny nose, cough, no fever, verbal consent for visit   Menstrual Status :   Hysterectomy   Nydia WATT Tracy - 8/17/2020 2:16 PM CDT   Health Status   Allergies Verified? :   Yes   Medication History Verified? :   Yes   Immunizations Current :   Yes   Medical History Verified? :   Yes   Pre-Visit Planning Status :   Completed   Tobacco Use? :   Never smoker   Stafford Tracy WATT - 8/17/2020 2:16 PM CDT   Consents   Consent for Immunization Exchange :   Consent Granted   Consent for Immunizations to Providers :   Consent Granted   Tracy Stafford CMA - 8/17/2020 2:16 PM CDT   Meds / Allergies   (As Of: 8/17/2020 2:20:04 PM CDT)   Allergies (Active)   ciprofloxacin  Estimated Onset Date:   Unspecified ; Created By:   Isatu Rodriguez; Reaction Status:   Active ; Category:   Drug ; Substance:   ciprofloxacin ; Type:   Allergy ; Updated By:   Isatu Rodriguez; Reviewed Date:   8/17/2020 2:18 PM CDT      penicillin  Estimated Onset Date:   Unspecified ; Created By:   Hanna Francis LPN; Reaction Status:   Active ; Category:   Drug ; Substance:   penicillin ; Type:   Allergy ; Updated By:   Hanna Francis LPN; Reviewed Date:   8/17/2020 2:18 PM CDT        Medication List   (As Of: 8/17/2020 2:20:04 PM CDT)   Prescription/Discharge Order    benzonatate  :   benzonatate ; Status:   Processing ; Ordered As Mnemonic:   Tessalon Perles 100 mg oral capsule ; Ordering Provider:   Renaldo Nair PA-C; Action Display:   Complete ; Catalog Code:   benzonatate ; Order Dt/Tm:   8/17/2020 2:18:40 PM CDT          buPROPion  :   buPROPion ; Status:   Processing ; Ordered As Mnemonic:   buPROPion 150 mg/24 hours (XL) oral tablet, extended release ; Ordering Provider:   Kt Seo MD; Action Display:   Complete ; Catalog Code:    buPROPion ; Order Dt/Tm:   8/17/2020 2:19:04 PM CDT          dilTIAZem  :   dilTIAZem ; Status:   Prescribed ; Ordered As Mnemonic:   dilTIAZem 240 mg/24 hours oral capsule, extended release ; Simple Display Line:   240 mg, 1 cap(s), PO, Daily, 90 cap(s), 3 Refill(s) ; Ordering Provider:   Kt Seo MD; Catalog Code:   dilTIAZem ; Order Dt/Tm:   2/27/2019 9:52:45 AM CST          furosemide  :   furosemide ; Status:   Prescribed ; Ordered As Mnemonic:   furosemide 40 mg oral tablet ; Simple Display Line:   40 mg, 1 tab(s), PO, Daily, PRN: as needed, 90 tab(s), 1 Refill(s) ; Ordering Provider:   Kt Seo MD; Catalog Code:   furosemide ; Order Dt/Tm:   2/27/2019 9:52:44 AM CST          ibuprofen 600 mg oral tablet  :   ibuprofen 600 mg oral tablet ; Status:   Prescribed ; Ordered As Mnemonic:   ibuprofen 600 mg oral tablet ; Simple Display Line:   1 tab(s), Oral, q8 hrs, PRN: AS NEEDED FOR PAIN, 90 tab(s), 2 Refill(s) ; Ordering Provider:   Kt Seo MD; Catalog Code:   ibuprofen ; Order Dt/Tm:   7/8/2019 9:41:55 AM CDT          losartan  :   losartan ; Status:   Prescribed ; Ordered As Mnemonic:   losartan 100 mg oral tablet ; Simple Display Line:   0.5 tab, po, daily, 90 tab(s), 3 Refill(s) ; Ordering Provider:   Kt Seo MD; Catalog Code:   losartan ; Order Dt/Tm:   2/27/2019 9:52:45 AM CST          metroNIDAZOLE  :   metroNIDAZOLE ; Status:   Prescribed ; Ordered As Mnemonic:   metroNIDAZOLE 500 mg oral tablet ; Simple Display Line:   500 mg, 1 tab(s), PO, q8hr, for 10 day(s), 30 tab(s), 0 Refill(s) ; Ordering Provider:   Kt Seo MD; Catalog Code:   metroNIDAZOLE ; Order Dt/Tm:   6/6/2019 4:19:21 PM CDT          metroNIDAZOLE  :   metroNIDAZOLE ; Status:   Prescribed ; Ordered As Mnemonic:   metroNIDAZOLE 500 mg oral tablet ; Simple Display Line:   500 mg, 1 tab(s), PO, q8hr, for 10 day(s), 30 tab(s), 0 Refill(s) ; Ordering Provider:   Kt Seo MD; Catalog Code:    metroNIDAZOLE ; Order Dt/Tm:   2/27/2019 10:03:04 AM CST          Misc Prescription  :   Misc Prescription ; Status:   Prescribed ; Ordered As Mnemonic:   IBUPROFEN 600MG TABLETS ; Simple Display Line:   1 tab(s), Oral, q8 hrs, PRN: AS NEEDED FOR PAIN, 90 tab(s) ; Ordering Provider:   Kt Seo MD; Catalog Code:   Miscellaneous Prescription ; Order Dt/Tm:   3/7/2019 11:26:21 AM CST          Miscellaneous Rx Supply  :   Miscellaneous Rx Supply ; Status:   Prescribed ; Ordered As Mnemonic:   Replacement CPAP +12cm H2o ; Simple Display Line:   See Instructions, CPAP machine, humidifier, heated tubing, filters, nasal or full face mask with cushion replacment, 1 EA ; Ordering Provider:   Kt Seo MD; Catalog Code:   Miscellaneous Rx Supply ; Order Dt/Tm:   4/16/2015 11:14:19 AM CDT ; Comment:   Months = 99 (Lifetime)          nystatin topical  :   nystatin topical ; Status:   Prescribed ; Ordered As Mnemonic:   nystatin 100,000 units/g topical powder ; Simple Display Line:   1 deep, TOP, TID, apply to affected area until healed, 30 g, 5 Refill(s) ; Ordering Provider:   Kt Seo MD; Catalog Code:   nystatin topical ; Order Dt/Tm:   6/3/2019 1:08:57 PM CDT          omeprazole  :   omeprazole ; Status:   Prescribed ; Ordered As Mnemonic:   omeprazole 20 mg oral delayed release capsule ; Simple Display Line:   20 mg, 1 cap(s), po, daily, 90 cap(s), 3 Refill(s) ; Ordering Provider:   Kt Seo MD; Catalog Code:   omeprazole ; Order Dt/Tm:   2/27/2019 9:52:46 AM CST          sulfamethoxazole-trimethoprim  :   sulfamethoxazole-trimethoprim ; Status:   Processing ; Ordered As Mnemonic:   Bactrim  mg-160 mg oral tablet ; Ordering Provider:   Kt Seo MD; Action Display:   Complete ; Catalog Code:   sulfamethoxazole-trimethoprim ; Order Dt/Tm:   8/17/2020 2:19:33 PM CDT          sulfamethoxazole-trimethoprim  :   sulfamethoxazole-trimethoprim ; Status:   Processing ; Ordered As Mnemonic:    Bactrim  mg-160 mg oral tablet ; Ordering Provider:   Kt Seo MD; Action Display:   Complete ; Catalog Code:   sulfamethoxazole-trimethoprim ; Order Dt/Tm:   8/17/2020 2:19:33 PM CDT            Home Meds    aspirin  :   aspirin ; Status:   Documented ; Ordered As Mnemonic:   aspirin 81 mg oral tablet ; Simple Display Line:   81 mg, 1 tab(s), PO, Daily, 90 tab(s) ; Catalog Code:   aspirin ; Order Dt/Tm:   2/18/2015 10:25:30 AM CST          cholecalciferol  :   cholecalciferol ; Status:   Documented ; Ordered As Mnemonic:   Vitamin D3 1000 intl units oral capsule ; Simple Display Line:   1,000 International Unit, 1 cap(s), po, daily, 0 Refill(s) ; Catalog Code:   cholecalciferol ; Order Dt/Tm:   11/29/2016 12:33:13 PM CST          mometasone topical  :   mometasone topical ; Status:   Documented ; Ordered As Mnemonic:   mometasone 0.1% topical cream ; Simple Display Line:   Topical, daily, 0 Refill(s) ; Catalog Code:   mometasone topical ; Order Dt/Tm:   2/27/2019 9:41:17 AM CST          multivitamin  :   multivitamin ; Status:   Documented ; Ordered As Mnemonic:   Vitamin B Complex 100 ; Simple Display Line:   1tab, daily ; Catalog Code:   multivitamin ; Order Dt/Tm:   2/18/2015 10:23:40 AM CST          multivitamin with minerals  :   multivitamin with minerals ; Status:   Documented ; Ordered As Mnemonic:   PreserVision ; Simple Display Line:   po, bid, 0 Refill(s) ; Catalog Code:   multivitamin with minerals ; Order Dt/Tm:   11/30/2015 9:29:25 AM CST          turmeric  :   turmeric ; Status:   Documented ; Ordered As Mnemonic:   turmeric ; Simple Display Line:   500 mg, daily, 0 Refill(s) ; Catalog Code:   turmeric ; Order Dt/Tm:   11/29/2016 12:34:06 PM CST            ID Risk Screen   Recent Travel History :   No recent travel   Family Member Travel History :   No recent travel   Other Exposure to Infectious Disease :   Unknown   Tracy Stafford CMA - 8/17/2020 2:16 PM CDT

## 2022-02-16 NOTE — NURSING NOTE
Comprehensive Intake Entered On:  4/25/2019 9:34 AM CDT    Performed On:  4/25/2019 9:26 AM CDT by Tracy Stafford CMA               Summary   Chief Complaint :   right eye has been mattery; possible sinus infection/allergies; sore throat; congestion; cough; denies fever    Menstrual Status :   Hysterectomy   Weight Measured :   236.2 lb(Converted to: 236 lb 3 oz, 107.14 kg)    Height Measured :   61.25 in(Converted to: 5 ft 1 in, 155.57 cm)    Body Mass Index :   44.26 kg/m2 (HI)    Body Surface Area :   2.15 m2   Systolic Blood Pressure :   122 mmHg   Diastolic Blood Pressure :   70 mmHg   Mean Arterial Pressure :   87 mmHg   Peripheral Pulse Rate :   66 bpm   BP Method :   Manual   HR Method :   Manual   Temperature Tympanic :   97.3 DegF(Converted to: 36.3 DegC)  (LOW)    Tracy Stafford CMA - 4/25/2019 9:26 AM CDT   Health Status   Allergies Verified? :   Yes   Medication History Verified? :   Yes   Immunizations Current :   Yes   Medical History Verified? :   Yes   Pre-Visit Planning Status :   Completed   Tobacco Use? :   Never smoker   Tracy Stafford CMA - 4/25/2019 9:26 AM CDT   Consents   Consent for Immunization Exchange :   Consent Granted   Consent for Immunizations to Providers :   Consent Granted   Tracy Stafford CMA - 4/25/2019 9:26 AM CDT   Meds / Allergies   (As Of: 4/25/2019 9:34:09 AM CDT)   Allergies (Active)   ciprofloxacin  Estimated Onset Date:   Unspecified ; Created By:   Isatu Rodriguez; Reaction Status:   Active ; Category:   Drug ; Substance:   ciprofloxacin ; Type:   Allergy ; Updated By:   Isatu Rodriguez; Reviewed Date:   4/25/2019 9:32 AM CDT      penicillin  Estimated Onset Date:   Unspecified ; Created By:   Hanna Francis LPN; Reaction Status:   Active ; Category:   Drug ; Substance:   penicillin ; Type:   Allergy ; Updated By:   Hanna Francis LPN; Reviewed Date:   4/25/2019 9:32 AM CDT        Medication List   (As Of: 4/25/2019 9:34:09 AM CDT)   Prescription/Discharge Order     benzonatate  :   benzonatate ; Status:   Prescribed ; Ordered As Mnemonic:   Tessalon Perles 100 mg oral capsule ; Simple Display Line:   200 mg, 2 cap(s), PO, TID, for 7 day(s), 42 cap(s), 0 Refill(s) ; Ordering Provider:   Renaldo Nair PA-C; Catalog Code:   benzonatate ; Order Dt/Tm:   12/20/2018 11:22:32 AM          buPROPion  :   buPROPion ; Status:   Prescribed ; Ordered As Mnemonic:   buPROPion 150 mg/24 hours (XL) oral tablet, extended release ; Simple Display Line:   150 mg, 1 tab(s), po, q 24 hrs, 90 tab(s), 3 Refill(s) ; Ordering Provider:   Kt Seo MD; Catalog Code:   buPROPion ; Order Dt/Tm:   10/3/2018 9:25:53 AM          dilTIAZem  :   dilTIAZem ; Status:   Prescribed ; Ordered As Mnemonic:   dilTIAZem 240 mg/24 hours oral capsule, extended release ; Simple Display Line:   240 mg, 1 cap(s), PO, Daily, 90 cap(s), 3 Refill(s) ; Ordering Provider:   Kt Seo MD; Catalog Code:   dilTIAZem ; Order Dt/Tm:   2/27/2019 9:52:45 AM          furosemide  :   furosemide ; Status:   Prescribed ; Ordered As Mnemonic:   furosemide 40 mg oral tablet ; Simple Display Line:   40 mg, 1 tab(s), PO, Daily, PRN: as needed, 90 tab(s), 1 Refill(s) ; Ordering Provider:   Kt Seo MD; Catalog Code:   furosemide ; Order Dt/Tm:   2/27/2019 9:52:44 AM          ibuprofen 600 mg oral tablet  :   ibuprofen 600 mg oral tablet ; Status:   Prescribed ; Ordered As Mnemonic:   ibuprofen 600 mg oral tablet ; Simple Display Line:   See Instructions, TAKE ONE TABLET BY MOUTH EVERY EIGHT HOURS AS NEEDED FOR PAIN, 90 tab(s), 2 Refill(s) ; Ordering Provider:   Kt Seo MD; Catalog Code:   ibuprofen ; Order Dt/Tm:   7/30/2018 2:07:40 PM          losartan  :   losartan ; Status:   Prescribed ; Ordered As Mnemonic:   losartan 100 mg oral tablet ; Simple Display Line:   0.5 tab, po, daily, 90 tab(s), 3 Refill(s) ; Ordering Provider:   Kt Seo MD; Catalog Code:   losartan ; Order Dt/Tm:   2/27/2019  9:52:45 AM          metroNIDAZOLE  :   metroNIDAZOLE ; Status:   Prescribed ; Ordered As Mnemonic:   metroNIDAZOLE 500 mg oral tablet ; Simple Display Line:   500 mg, 1 tab(s), PO, q8hr, for 10 day(s), 30 tab(s), 0 Refill(s) ; Ordering Provider:   Kt Seo MD; Catalog Code:   metroNIDAZOLE ; Order Dt/Tm:   2/27/2019 10:03:04 AM          Misc Prescription  :   Misc Prescription ; Status:   Prescribed ; Ordered As Mnemonic:   IBUPROFEN 600MG TABLETS ; Simple Display Line:   1 tab(s), Oral, q8 hrs, PRN: AS NEEDED FOR PAIN, 90 tab(s) ; Ordering Provider:   Kt Seo MD; Catalog Code:   Miscellaneous Prescription ; Order Dt/Tm:   3/7/2019 11:26:21 AM          Miscellaneous Rx Supply  :   Miscellaneous Rx Supply ; Status:   Prescribed ; Ordered As Mnemonic:   Replacement CPAP +12cm H2o ; Simple Display Line:   See Instructions, CPAP machine, humidifier, heated tubing, filters, nasal or full face mask with cushion replacment, 1 EA ; Ordering Provider:   Kt Seo MD; Catalog Code:   Miscellaneous Rx Supply ; Order Dt/Tm:   4/16/2015 11:14:19 AM ; Comment:   Months = 99 (Lifetime)          omeprazole  :   omeprazole ; Status:   Prescribed ; Ordered As Mnemonic:   omeprazole 20 mg oral delayed release capsule ; Simple Display Line:   20 mg, 1 cap(s), po, daily, 90 cap(s), 3 Refill(s) ; Ordering Provider:   Kt Seo MD; Catalog Code:   omeprazole ; Order Dt/Tm:   2/27/2019 9:52:46 AM          sulfamethoxazole-trimethoprim  :   sulfamethoxazole-trimethoprim ; Status:   Processing ; Ordered As Mnemonic:   Bactrim  mg-160 mg oral tablet ; Ordering Provider:   Kt Seo MD; Action Display:   Complete ; Catalog Code:   sulfamethoxazole-trimethoprim ; Order Dt/Tm:   4/25/2019 9:31:43 AM            Home Meds    aspirin  :   aspirin ; Status:   Documented ; Ordered As Mnemonic:   aspirin 81 mg oral tablet ; Simple Display Line:   81 mg, 1 tab(s), PO, Daily, 90 tab(s) ; Catalog Code:    aspirin ; Order Dt/Tm:   2/18/2015 10:25:30 AM          cholecalciferol  :   cholecalciferol ; Status:   Documented ; Ordered As Mnemonic:   Vitamin D3 1000 intl units oral capsule ; Simple Display Line:   1,000 International Unit, 1 cap(s), po, daily, 0 Refill(s) ; Catalog Code:   cholecalciferol ; Order Dt/Tm:   11/29/2016 12:33:13 PM          mometasone topical  :   mometasone topical ; Status:   Documented ; Ordered As Mnemonic:   mometasone 0.1% topical cream ; Simple Display Line:   Topical, daily, 0 Refill(s) ; Catalog Code:   mometasone topical ; Order Dt/Tm:   2/27/2019 9:41:17 AM          multivitamin  :   multivitamin ; Status:   Documented ; Ordered As Mnemonic:   Vitamin B Complex 100 ; Simple Display Line:   1tab, daily ; Catalog Code:   multivitamin ; Order Dt/Tm:   2/18/2015 10:23:40 AM          multivitamin with minerals  :   multivitamin with minerals ; Status:   Documented ; Ordered As Mnemonic:   PreserVision ; Simple Display Line:   po, bid, 0 Refill(s) ; Catalog Code:   multivitamin with minerals ; Order Dt/Tm:   11/30/2015 9:29:25 AM          turmeric  :   turmeric ; Status:   Documented ; Ordered As Mnemonic:   turmeric ; Simple Display Line:   500 mg, daily, 0 Refill(s) ; Catalog Code:   turmeric ; Order Dt/Tm:   11/29/2016 12:34:06 PM

## 2022-02-16 NOTE — NURSING NOTE
CAGE Assessment Entered On:  2/27/2019 10:08 AM CST    Performed On:  2/27/2019 10:08 AM CST by Tracy Stafford CMA               Assessment   Have you ever felt you should cut down on your drinking :   No   Have people annoyed you by criticizing your drinking :   No   Have you ever felt bad or guilty about your drinking :   No   Have you ever taken a drink first thing in the morning to steady your nerves or get rid of a hangover (Eye-opener) :   No   CAGE Score :   0    Tracy Stafford CMA - 2/27/2019 10:08 AM CST

## 2022-02-16 NOTE — TELEPHONE ENCOUNTER
Order is faxed ([phone]) to [facility] and they will contact patient.

## 2022-02-16 NOTE — PROGRESS NOTES
Patient:   CARLINE SINGH            MRN: [id number]            FIN: [id number]               Age:   66 years     Sex:  Female     :  1950   Associated Diagnoses:   Recurrent maxillary sinusitis   Author:   Renaldo Nair PA-C      Visit Information      Primary Care Provider (PCP):  Kt Seo MD# [id number]   Visit type:  General concerns.    Accompanied by:  No one.    Source of history:  Self, Medical record.    History limitation:  None.       Chief Complaint   3/27/2017 11:42 AM CDT   sore throat, sinus pressure/congestion and cough x 6 days        History of Present Illness             The patient presents with sinus problem.  The sinus problem is located in the frontal sinus and maxillary sinus.  The sinus problem is characterized by nasal congestion, headache, facial pain and purulent drainage in eyes.  The severity of the sinus problem is severe.  The sinus problem is constant and is worsening.  The sinus problem has lasted for 6 day(s).  Associated symptoms consist of cough, sore throat, denies fever and denies eye pain.        Review of Systems   Constitutional:  Negative except as documented in history of present illness.    Eye:  Negative.    Ear/Nose/Mouth/Throat:  Negative except as documented in history of present illness.    Respiratory:  Cough.       Health Status   Allergies:    Allergic Reactions (All)  Severity Not Documented  Ciprofloxacin (No reactions were documented)  Penicillin (No reactions were documented)   Medications:  (Selected)   Prescriptions  Prescribed  Replacement CPAP +12cm H2o: Replacement CPAP +12cm H2o, See Instructions, Instructions: CPAP machine, humidifier, heated tubing, filters, nasal or full face mask with cushion replacment, Supply, # 1 EA, 0 Refill(s), Type: Maintenance  Zithromax 250 mg oral tablet: 1 packet(s), PO, Once, Instructions: as directed on package labeling. Two tablets po on day one, then one tablet daily x four days, # 6 tab(s), 0  Refill(s), Type: Soft Stop, Pharmacy: [id number], 1 packet(s) po once,Instr:as directed on pac...  buPROPion 300 mg/24 hours (XL) oral tablet, extended release: 1 tab(s) ( 300 mg ), po, daily, # 90 tab(s), 3 Refill(s), Type: Maintenance, Pharmacy: [id number], 1 tab(s) po daily  diltiaZEM 240 mg/24 hours oral capsule, extended release: 1 cap(s) ( 240 mg ), PO, Daily, # 90 cap(s), 3 Refill(s), Type: Maintenance, Pharmacy: [id number], 1 cap(s) po daily  furosemide 40 mg oral tablet: 1 tab(s) ( 40 mg ), PO, Daily, PRN: as needed, # 90 tab(s), 1 Refill(s), Type: Maintenance, Pharmacy: [facility], 1 tab(s) po daily,PRN:as needed  ibuprofen 600 mg oral tablet: See Instructions, Instructions: TAKE ONE TABLET BY MOUTH EVERY EIGHT HOURS, # 90 tab(s), 1 Refill(s), Type: Soft Stop, Pharmacy: [id number]  losartan 100 mg oral tablet: 1 tab(s) ( 100 mg ), PO, Daily, # 90 tab(s), 3 Refill(s), Type: Maintenance, Pharmacy: [id number], 1 tab(s) po daily  omeprazole 20 mg oral delayed release tablet: 1 tab(s) ( 20 mg ), po, daily, # 90 tab(s), 3 Refill(s), Type: Maintenance, Pharmacy: [id number], 1 tab(s) po daily  Documented Medications  Documented  Josefina-C 500 mg oral tablet: ( 500 mg ), po, daily, 0 Refill(s), Type: Maintenance  Fiber Complex: Fiber Complex, daily, Supply, 0 Refill(s), Type: Maintenance  Fish Oil 1200 mg oral capsule: 1 cap(s) ( 1,200 mg ), po, daily, 0 Refill(s), Type: Maintenance  Hair, Skin & Nails: ( 5 mg ), po, daily, 0 Refill(s), Type: Maintenance  Hi-Potency Quer cetin: Hi-Potency Quer cetin, daily, Supply, 0 Refill(s), Type: Maintenance  Hormone Essentials: Hormone Essentials, daily, Supply, 0 Refill(s), Type: Maintenance  Kelp,B10,Cider vinegar,Lecithin: Kelp,B10,Cider vinegar,Lecithin, daily, Supply, 0 Refill(s), Type: Maintenance  Lemon Peel-full spectrum: Lemon Peel-full spectrum, daily, Supply, 0 Refill(s), Type:  Maintenance  PreserVision: po, bid, 0 Refill(s), Type: Maintenance  Vagifem 10 mcg vaginal tablet: 1 EA ( 10 mcg ), vag, 2x/wk, 0 Refill(s), Type: Maintenance  Vinpocetine-memory support: Vinpocetine-memory support, ( 30 mg ), daily, Supply, 0 Refill(s), Type: Maintenance  Vitamin B Complex 100: 1tab, daily, 0 Refill(s), Type: Maintenance  Vitamin C 500 mg oral capsule: 1 cap(s) ( 500 mg ), PO, Daily, # 30 cap(s), 0 Refill(s), Type: Maintenance  Vitamin D3 1000 intl units oral capsule: 1 cap(s) ( 1,000 International Unit ), po, daily, 0 Refill(s), Type: Maintenance  aspirin 81 mg oral tablet: 1 tab(s) ( 81 mg ), PO, Daily, # 90 tab(s), 0 Refill(s), Type: Maintenance  lutein 20 mg oral tablet: 1 tab(s) ( 20 mg ), po, daily, 0 Refill(s), Type: Maintenance  lysine 500 mg oral tablet: 2 tab(s) ( 1,000 mg ), po, daily, 0 Refill(s), Type: Maintenance  turmeric: ( 500 mg ), daily, 0 Refill(s), Type: Maintenance   Problem list:    All Problems  Changing skin lesion / SNOMED CT 0497696294 / Confirmed  Hypertension / SNOMED CT 1683181220 / Confirmed  Moderate major depression / SNOMED CT [id number] / Confirmed  Morbid obesity / SNOMED CT 059897779 / Probable  Hypercholesteremia / SNOMED CT 598279498 / Confirmed  Resolved: Sleep apnea / SNOMED CT 645852446  Resolved: Pregnancy / SNOMED CT 291884196      Histories   Past Medical History:    Active  Hypertension (4527328985)  Hypercholesteremia (213633941)  Morbid obesity (676836883)  Moderate major depression ([id number])  Resolved  Sleep apnea (717959946):  Resolved on 3/8/2017 at 66 years.  Pregnancy (657290536):  Resolved on 11/26/1983 at 33 years.   Family History:    MI  Mother  Father  Melanoma  Mother  Sister  Diabetes mellitus  Brother (Eliud)  CA - Cancer of colon  Uncle (M)  CAD - Coronary artery disease  Mother  Father     Procedure history:    Polysomnogram-Titration Study on 3/31/2015 at 64  Years.  Comments:  2015 7:19 PM - Iyer Lesli WATT  CPAP titrated to +11cm H2O which yielded AHI of 2.3/hr and eliminated snoring  Colonoscopy (896395469) on 2013 at 62 Years.  Comments:  2015 8:51 AM - Isatu Rodriguez  Repeat in 10 years.  Hx of removal of cyst (V15.29) on 2011 at 60 Years.  Colonoscopy (801310402) on 2008 at 58 Years.  Colonoscopy and biopsy of colon (5175847968) on 2004 at 54 Years.  Total left knee arthroplasty in the month of 2004 at 53 Years.  Excision of corneal lesion (923959521) on 10/4/1999 at 49 Years.  Comments:  2015 8:55 AM - Isatu Rodriguez  Left.  Left knee debridement in  at 47 Years.  Incidental appendectomy (973078929) in  at 42 Years.  D&C - Dilatation and curettage (4873091295) in  at 35 Years.  Hysterectomy (299451728) in  at 33 Years.   section (75221229) in  at 33 Years.  Tonsillectomy (760006722).   Social History:        Alcohol Assessment            Past                     Comments:                      2017 - Hanna Francis LPN                     Denies alcohol use.      Tobacco Assessment: Denies Tobacco Use            Never      Substance Abuse Assessment: Denies Substance Abuse            Never      Employment and Education Assessment            Part time      Home and Environment Assessment            Marital status:  (Living together).  Spouse/Partner name: Marvel.  Lives with Spouse.  4 children.               Living situation: Home/Independent.      Nutrition and Health Assessment            Type of diet: Regular.      Exercise and Physical Activity Assessment: Regular exercise            Exercise frequency: 3-4 times/week.  Exercise type: Water aerobics.      Sexual Assessment            Sexually active: Yes.  Sexual orientation: Heterosexual.  Other contraceptive use: Hysterectomy.      Other Assessment            First menses age 13.  Regular menses.                     Comments:                       02/26/2015 - Isatu Rodriguez                     Wears glasses.        Physical Examination   Vital Signs   3/27/2017 11:42 AM CDT Temperature Tympanic 98.0 DegF    Peripheral Pulse Rate 80 bpm    Pulse Site Radial artery    HR Method Manual    Systolic Blood Pressure 116 mmHg    Diastolic Blood Pressure 74 mmHg    Mean Arterial Pressure 88 mmHg    BP Site Left arm    BP Method Manual      Measurements from flowsheet : Measurements   3/27/2017 11:42 AM CDT   Ht/Wt Measurement Refused by Patient?     Yes     General:  Alert and oriented, No acute distress.    Eye:  Pupils are equal, round and reactive to light, Normal conjunctiva.    HENT:  Normocephalic, Tympanic membranes are clear, Oral mucosa is moist, No pharyngeal erythema.         Sinus: Bilateral, Frontal sinus, Maxillary sinus, Tenderness.    Neck:  Supple, Non-tender.    Respiratory:  Lungs are clear to auscultation.    Cardiovascular:  Normal rate, Regular rhythm.       Impression and Plan   Diagnosis     Recurrent maxillary sinusitis (SWO29-ES J01.01).     Patient Instructions:       Counseled: Patient, Regarding diagnosis, Regarding medications, Activity, Verbalized understanding.    Orders     Orders (Selected)   Prescriptions  Prescribed  Zithromax 250 mg oral tablet: 1 packet(s), PO, Once, Instructions: as directed on package labeling. Two tablets po on day one, then one tablet daily x four days, # 6 tab(s), 0 Refill(s), Type: Soft Stop, Pharmacy: [id number], 1 packet(s) po once,Instr:as directed on pac....     Take medicine as prescribed, side effects discussed.  Tylenol/ibuprofen for fever and discomfort.  Push fluids.  RTC if not improving in 36-48 hours, prior if concerns as we have discussed.

## 2022-02-16 NOTE — NURSING NOTE
Comprehensive Intake Entered On:  2/27/2019 9:43 AM CST    Performed On:  2/27/2019 9:37 AM CST by Tracy Stafford CMA               Summary   Chief Complaint :   Med Check; review current medications; refills needed   Menstrual Status :   Hysterectomy   Weight Measured :   238.2 lb(Converted to: 238 lb 3 oz, 108.05 kg)    Height Measured :   61.25 in(Converted to: 5 ft 1 in, 155.57 cm)    Body Mass Index :   44.64 kg/m2 (HI)    Body Surface Area :   2.16 m2   Systolic Blood Pressure :   134 mmHg (HI)    Diastolic Blood Pressure :   80 mmHg   Mean Arterial Pressure :   98 mmHg   Peripheral Pulse Rate :   80 bpm   BP Method :   Manual   HR Method :   Manual   Temperature Tympanic :   98.1 DegF(Converted to: 36.7 DegC)    Tracy Stafford CMA - 2/27/2019 9:37 AM CST   Health Status   Allergies Verified? :   Yes   Medication History Verified? :   Yes   Immunizations Current :   Yes   Medical History Verified? :   Yes   Pre-Visit Planning Status :   Completed   Tobacco Use? :   Never smoker   Tracy Stafford CMA - 2/27/2019 9:37 AM CST   Consents   Consent for Immunization Exchange :   Consent Granted   Consent for Immunizations to Providers :   Consent Granted   Tracy Stafford CMA - 2/27/2019 9:37 AM CST   Meds / Allergies   (As Of: 2/27/2019 9:43:58 AM CST)   Allergies (Active)   ciprofloxacin  Estimated Onset Date:   Unspecified ; Created By:   Isatu Rodriguez; Reaction Status:   Active ; Category:   Drug ; Substance:   ciprofloxacin ; Type:   Allergy ; Updated By:   Isatu Rodriguez; Reviewed Date:   2/27/2019 9:40 AM CST      penicillin  Estimated Onset Date:   Unspecified ; Created By:   Hanna Francis LPN; Reaction Status:   Active ; Category:   Drug ; Substance:   penicillin ; Type:   Allergy ; Updated By:   Hanna Francis LPN; Reviewed Date:   2/27/2019 9:40 AM CST        Medication List   (As Of: 2/27/2019 9:43:58 AM CST)   Prescription/Discharge Order    benzonatate  :   benzonatate ; Status:   Prescribed ; Ordered As  Mnemonic:   Tessalon Perles 100 mg oral capsule ; Simple Display Line:   200 mg, 2 cap(s), PO, TID, for 7 day(s), 42 cap(s), 0 Refill(s) ; Ordering Provider:   Renaldo Nair PA-C; Catalog Code:   benzonatate ; Order Dt/Tm:   12/20/2018 11:22:32 AM          buPROPion  :   buPROPion ; Status:   Prescribed ; Ordered As Mnemonic:   buPROPion 150 mg/24 hours (XL) oral tablet, extended release ; Simple Display Line:   150 mg, 1 tab(s), po, q 24 hrs, 90 tab(s), 3 Refill(s) ; Ordering Provider:   Kt Seo MD; Catalog Code:   buPROPion ; Order Dt/Tm:   10/3/2018 9:25:53 AM          dilTIAZem  :   dilTIAZem ; Status:   Prescribed ; Ordered As Mnemonic:   dilTIAZem 240 mg/24 hours oral capsule, extended release ; Simple Display Line:   240 mg, 1 cap(s), PO, Daily, 90 cap(s), 3 Refill(s) ; Ordering Provider:   Kt Seo MD; Catalog Code:   dilTIAZem ; Order Dt/Tm:   10/3/2018 9:26:04 AM          furosemide  :   furosemide ; Status:   Prescribed ; Ordered As Mnemonic:   furosemide 40 mg oral tablet ; Simple Display Line:   40 mg, 1 tab(s), PO, Daily, 90 tab(s), PRN: as needed ; Ordering Provider:   Kt Seo MD; Catalog Code:   furosemide ; Order Dt/Tm:   5/11/2015 10:30:19 AM          ibuprofen 600 mg oral tablet  :   ibuprofen 600 mg oral tablet ; Status:   Prescribed ; Ordered As Mnemonic:   ibuprofen 600 mg oral tablet ; Simple Display Line:   See Instructions, TAKE ONE TABLET BY MOUTH EVERY EIGHT HOURS AS NEEDED FOR PAIN, 90 tab(s), 2 Refill(s) ; Ordering Provider:   Kt Seo MD; Catalog Code:   ibuprofen ; Order Dt/Tm:   7/30/2018 2:07:40 PM          losartan  :   losartan ; Status:   Prescribed ; Ordered As Mnemonic:   losartan 100 mg oral tablet ; Simple Display Line:   0.5 tab, po, daily, 90 tab(s), 3 Refill(s) ; Ordering Provider:   Kt Seo MD; Catalog Code:   losartan ; Order Dt/Tm:   10/3/2018 9:25:51 AM          Miscellaneous Rx Supply  :   Miscellaneous Rx Supply ; Status:    Prescribed ; Ordered As Mnemonic:   Replacement CPAP +12cm H2o ; Simple Display Line:   See Instructions, CPAP machine, humidifier, heated tubing, filters, nasal or full face mask with cushion replacment, 1 EA ; Ordering Provider:   Kt Seo MD; Catalog Code:   Miscellaneous Rx Supply ; Order Dt/Tm:   4/16/2015 11:14:19 AM ; Comment:   Months = 99 (Lifetime)          omeprazole  :   omeprazole ; Status:   Prescribed ; Ordered As Mnemonic:   omeprazole 20 mg oral delayed release capsule ; Simple Display Line:   20 mg, 1 cap(s), po, daily, 90 cap(s), 3 Refill(s) ; Ordering Provider:   Kt Seo MD; Catalog Code:   omeprazole ; Order Dt/Tm:   9/4/2018 1:26:34 PM          sulfamethoxazole-trimethoprim  :   sulfamethoxazole-trimethoprim ; Status:   Completed ; Ordered As Mnemonic:   Bactrim  mg-160 mg oral tablet ; Simple Display Line:   1 tab(s), PO, BID, for 10 day(s), 20 tab(s), 0 Refill(s) ; Ordering Provider:   Kt Seo MD; Catalog Code:   sulfamethoxazole-trimethoprim ; Order Dt/Tm:   12/27/2018 12:16:06 PM            Home Meds    aspirin  :   aspirin ; Status:   Documented ; Ordered As Mnemonic:   aspirin 81 mg oral tablet ; Simple Display Line:   81 mg, 1 tab(s), PO, Daily, 90 tab(s) ; Catalog Code:   aspirin ; Order Dt/Tm:   2/18/2015 10:25:30 AM          cholecalciferol  :   cholecalciferol ; Status:   Documented ; Ordered As Mnemonic:   Vitamin D3 1000 intl units oral capsule ; Simple Display Line:   1,000 International Unit, 1 cap(s), po, daily, 0 Refill(s) ; Catalog Code:   cholecalciferol ; Order Dt/Tm:   11/29/2016 12:33:13 PM          mometasone topical  :   mometasone topical ; Status:   Documented ; Ordered As Mnemonic:   mometasone 0.1% topical cream ; Simple Display Line:   Topical, daily, 0 Refill(s) ; Catalog Code:   mometasone topical ; Order Dt/Tm:   2/27/2019 9:41:17 AM          multivitamin  :   multivitamin ; Status:   Documented ; Ordered As Mnemonic:   Vitamin B  Complex 100 ; Simple Display Line:   1tab, daily ; Catalog Code:   multivitamin ; Order Dt/Tm:   2/18/2015 10:23:40 AM          multivitamin with minerals  :   multivitamin with minerals ; Status:   Documented ; Ordered As Mnemonic:   PreserVision ; Simple Display Line:   po, bid, 0 Refill(s) ; Catalog Code:   multivitamin with minerals ; Order Dt/Tm:   11/30/2015 9:29:25 AM          turmeric  :   turmeric ; Status:   Documented ; Ordered As Mnemonic:   turmeric ; Simple Display Line:   500 mg, daily, 0 Refill(s) ; Catalog Code:   turmeric ; Order Dt/Tm:   11/29/2016 12:34:06 PM

## 2022-02-16 NOTE — TELEPHONE ENCOUNTER
---------------------  From: Kt Seo MD   To: Phone Messages [employer] ([id number]_[facility]); Kt Seo MD;     Sent: 1/10/2019 5:17:42 PM CST  Subject: CT sinus results     Called patient regarding CT sinuses. CT shows bilateral maxillary sinusitis.   LM for her to call us tomorrow.   Given multiple courses of antibiotics without relief of symptoms, should consider consultation with ENT for further evaluation  Happy to discuss with her on Monday if she would like.Returned Call    Time: 9:56 am  Note:  LMTCBPt calls back - msg given. Would like to go ahead with ENT referral.---------------------  From: Osiris Chris CMA (Phone Messages [employer] ([id number]_[facility]))   To: Kt Seo MD;     Sent: 1/11/2019 4:22:03 PM CST  Subject: RE: CT sinus results  ** Submitted: **  Order:Referral (Request)  Details:  1/14/2019 7:55 AM CST, Referred to: Otolaryngology (ENT), Reason for referral: chronic sinusitis on CT scan not improving with repeated courses of antibiotics., Recurrent maxillary sinusitis         Signed by Kt Seo MD  1/14/2019 7:55:00 AM---------------------  From: Kt Seo MD   To: Referral Coordinators Pool ([id number]_[facility]);     Sent: 1/14/2019 7:56:03 AM CST  Subject: FW: CT sinus results

## 2022-02-16 NOTE — PROGRESS NOTES
Patient:   CARLINE SINGH            MRN: [id number]            FIN: [id number]               Age:   67 years     Sex:  Female     :  1950   Associated Diagnoses:   Peripheral edema; Anemia; Hypertension; Moderate major depression; Antibiotic prophylaxis for dental procedure indicated due to prior joint replacement   Author:   Kt Seo MD      Chief Complaint   1/15/2018 8:51 AM CST    c/o bruising and swelling in ankles x 2 months      History of Present Illness   Patient here with 2 months of swelling in ankles right more than left. Legs get tender with touch.  Has noticed bruises more easily over the past couple of weeks. Feels like veins have been more dark and prominent. Not sticking up.  Has been babysitting young grandson so does get bumped frequently by him.   Patient also notes she is having dental care. Needs updated preventive Rx. Usually gets from dentist, not sure what she takes.  Patient starting new ketogenic diet. Hoping to be able to get off all medications. Mood has been good. Would like to wean down on bupropion.   Notes refills are needed to [facility] for medications.  KATHRIN is stable, using cpap. No changes at this time.      Review of Systems   Constitutional:  Negative.    Respiratory:  Negative.    Cardiovascular:  Negative except as documented in history of present illness.    Musculoskeletal:  Joint pain, noting hands are getting worse, has cut back significantly on use of advil.    Integumentary:  Negative except as documented in history of present illness.    Psychiatric:  Negative.       Health Status   Allergies:    Allergic Reactions (All)  Severity Not Documented  Ciprofloxacin (No reactions were documented)  Penicillin (No reactions were documented)   Medications:  (Selected)   Prescriptions  Prescribed  Replacement CPAP +12cm H2o: Replacement CPAP +12cm H2o, See Instructions, Instructions: CPAP machine, humidifier, heated tubing, filters, nasal or full face mask with cushion  replacment, Supply, # 1 EA, 0 Refill(s), Type: Maintenance  buPROPion 300 mg/24 hours (XL) oral tablet, extended release: 1 tab(s) ( 300 mg ), po, daily, # 30 tab(s), 0 Refill(s), Type: Maintenance, Pharmacy: [facility], Appt needed for further refills, 1 tab(s) po daily  dilTIAZem 240 mg/24 hours oral capsule, extended release: 1 cap(s) ( 240 mg ), PO, Daily, # 90 cap(s), 0 Refill(s), Type: Maintenance, Pharmacy: [facility], 1 cap(s) po daily  furosemide 40 mg oral tablet: 1 tab(s) ( 40 mg ), PO, Daily, PRN: as needed, # 90 tab(s), 1 Refill(s), Type: Maintenance, Pharmacy: [id number], 1 tab(s) po daily,PRN:as needed  ibuprofen 600 mg oral tablet: 1 tab(s) ( 600 mg ), po, q8 hrs, PRN: as needed for pain, # 90 tab(s), 1 Refill(s), Type: Soft Stop, Pharmacy: [facility], 1 tab(s) po q8 hrs,PRN:as needed for pain  ketoconazole 2% topical shampoo: 1 deep, TOP, 2x/Wk, # 120 mL, 11 Refill(s), Type: Maintenance, Pharmacy: [facility], 1 deep top 2x/wk  losartan 100 mg oral tablet: 1 tab(s) ( 100 mg ), po, daily, # 30 tab(s), 0 Refill(s), Type: Maintenance, Pharmacy: [facility], Due for appt next month  omeprazole 20 mg oral delayed release capsule: 1 cap(s) ( 20 mg ), po, daily, # 30 cap(s), 0 Refill(s), Type: Maintenance, Pharmacy: [id number]  Documented Medications  Documented  Josefina-C 500 mg oral tablet: ( 500 mg ), po, daily, 0 Refill(s), Type: Maintenance  Fish Oil 1200 mg oral capsule: 1 cap(s) ( 1,200 mg ), po, daily, 0 Refill(s), Type: Maintenance  Hair, Skin & Nails: ( 5 mg ), po, daily, 0 Refill(s), Type: Maintenance  Kelp,B10,Cider vinegar,Lecithin: Kelp,B10,Cider vinegar,Lecithin, daily, Supply, 0 Refill(s), Type: Maintenance  PreserVision: po, bid, 0 Refill(s), Type: Maintenance  Vitamin B Complex 100: 1tab, daily, 0 Refill(s), Type: Maintenance  Vitamin C 500 mg oral capsule: 1 cap(s) ( 500 mg  ), PO, Daily, # 30 cap(s), 0 Refill(s), Type: Maintenance  Vitamin D3 1000 intl units oral capsule: 1 cap(s) ( 1,000 International Unit ), po, daily, 0 Refill(s), Type: Maintenance  aspirin 81 mg oral tablet: 1 tab(s) ( 81 mg ), PO, Daily, # 90 tab(s), 0 Refill(s), Type: Maintenance  lutein 20 mg oral tablet: 1 tab(s) ( 20 mg ), po, daily, 0 Refill(s), Type: Maintenance  methylsulfonylmethane: 0 Refill(s), Type: Maintenance  turmeric: ( 500 mg ), daily, 0 Refill(s), Type: Maintenance   Problem list:    All Problems (Selected)  Changing skin lesion / SNOMED CT 6005056471 / Confirmed  Hypertension / SNOMED CT 2524656171 / Confirmed  Moderate major depression / SNOMED CT [id number] / Confirmed  Morbid obesity / SNOMED CT 848528629 / Probable  Hypercholesteremia / SNOMED CT 707169654 / Confirmed      Histories   Past Medical History:    Active  Hypertension (SNOMED CT 9243430449)  Hypercholesteremia (SNOMED CT 485576227)  Morbid obesity (SNOMED CT 333160234)  Moderate major depression (SNOMED CT [id number])  Resolved  Sleep apnea (SNOMED CT 072219897):  Resolved on 3/8/2017 at 66 years.  Pregnancy (SNOMED CT 614193842):  Resolved on 11/26/1983 at 33 years.   Procedure history:    Polysomnogram-Titration Study on 3/31/2015 at 64 Years.  Comments:  4/8/2015 7:19 PM - Lesli Iyer CMA  CPAP titrated to +11cm H2O which yielded AHI of 2.3/hr and eliminated snoring  Colonoscopy ([id number]) on 6/6/2013 at 62 Years.  Comments:  2/24/2015 8:51 AM - Isatu Rodriguez  Repeat in 10 years.  Hx of removal of cyst (V15.29) on 6/27/2011 at 60 Years.  Colonoscopy ([id number]) on 12/29/2008 at 58 Years.  Colonoscopy and biopsy of colon (9480299487) on 12/20/2004 at 54 Years.  Total left knee arthroplasty in the month of 6/2004 at 53 Years.  Excision of corneal lesion ([id number]) on 10/4/1999 at 49 Years.  Comments:  2/24/2015 8:55 MARK - Isatu Rodriguez.  Left knee debridement in 1997  at 47 Years.  Incidental appendectomy ([id number]) in  at 42 Years.  D&C - Dilatation and curettage (7764865661) in  at 35 Years.  Hysterectomy (235353928) in  at 33 Years.   section ([id number]) in  at 33 Years.  Tonsillectomy (473424742).      Physical Examination   Vital Signs   1/15/2018 8:51 AM CST Temperature Tympanic 97.2 DegF  LOW    Peripheral Pulse Rate 68 bpm    Pulse Site Radial artery    HR Method Manual    Systolic Blood Pressure 124 mmHg    Diastolic Blood Pressure 72 mmHg    Mean Arterial Pressure 89 mmHg    BP Site Right arm    BP Method Manual      Measurements from flowsheet : Measurements   1/15/2018 8:51 AM CST Height Measured - Standard 61.5 in    Weight Measured - Standard 233.2 lb    BSA 2.14 m2    Body Mass Index 43.34 kg/m2  HI      General:  Alert and oriented, No acute distress.    Cardiovascular:  Normal rate, Regular rhythm, 1+ edema.    Integumentary:  no bruising seen, no changes to veins apparent on my exam.    Neurologic:  Alert, Oriented.    Psychiatric:  Cooperative, Appropriate mood & affect.       Impression and Plan   Diagnosis     Peripheral edema (MHI20-TC R60.9).     Anemia (FGR59-HW D64.9).     Hypertension (FNV51-RM I10).     Course:  not improving, concerns about brusiing although not seen today. Will check labs. Continue current medications at this time..    Orders     Orders (Selected)   Outpatient Orders  Ordered  Return to Clinic (Request): RFV: HTN med check, Return in 1 year  Ordered (In Transit)  CBC (includes diff/plt)* ([facility]): Specimen Type: Blood, Collection Date: 01/15/18 9:01:00 CST  Comprehensive Metabolic Panel* ([facility]): Specimen Type: Serum, Collection Date: 01/15/18 9:01:00 CST  Lipid panel with reflex to direct ldl* ([facility]): Specimen Type: Serum, Collection Date: 01/15/18 9:01:00 CST  Prescriptions  Prescribed  dilTIAZem 240 mg/24 hours oral capsule, extended release: 1 cap(s) ( 240 mg ), PO, Daily, # 90 cap(s), 3 Refill(s), Type:  Maintenance, Pharmacy: [facility], 1 cap(s) po daily  losartan 100 mg oral tablet: 1 tab(s) ( 100 mg ), po, daily, # 90 tab(s), 3 Refill(s), Type: Maintenance, Pharmacy: [facility], 1 tab(s) po daily.     Diagnosis     Moderate major depression (VSE54-SV F32.1).     Course:  Improving.    Orders     Will try decreasing bupropion to 150mg daily and monitor. If does well, can let us know and will discontinue.     Diagnosis     Antibiotic prophylaxis for dental procedure indicated due to prior joint replacement (MZB51-CO Z79.2).     Orders     Orders   Pharmacy:  Keflex 500 mg oral capsule (Prescribe): 4 cap(s) ( 2,000 mg ), po, once, # 4 cap(s), 0 Refill(s), Type: Soft Stop, called to pharmacy (Rx).

## 2022-02-16 NOTE — PROGRESS NOTES
Patient:   CARLINE SINGH            MRN: [id number]            FIN: [id number]               Age:   68 years     Sex:  Female     :  1950   Associated Diagnoses:   Hypercholesteremia; Hypertension; Moderate major depression; Diverticulosis   Author:   Kt Seo MD      Chief Complaint   2019 9:37 AM CST    Med Check; review current medications; refills needed      History of Present Illness   HTN follow up, tolerating medication, no concerns, due for refills  weight loss discussed, patient working at resuming exercise, plans for following keto diet, hopes that with weight loss she will be able to decrease medication  diverticulitis discussed, had another 2-3 days of lower abdominal pain last week that resolved without intervention, has had issues with diverticulitis, colonoscopy up to date, would like antibitoics on hand in case symptoms return  mood has been good, planning on weaning off bupropion            Health Status   Allergies:    Allergic Reactions (All)  Severity Not Documented  Ciprofloxacin (No reactions were documented)  Penicillin (No reactions were documented)   Medications:  (Selected)   Prescriptions  Prescribed  Replacement CPAP +12cm H2o: Replacement CPAP +12cm H2o, See Instructions, Instructions: CPAP machine, humidifier, heated tubing, filters, nasal or full face mask with cushion replacment, Supply, # 1 EA, 0 Refill(s), Type: Maintenance  Tessalon Perles 100 mg oral capsule: = 2 cap(s) ( 200 mg ), PO, TID, # 42 cap(s), 0 Refill(s), Type: Maintenance, Pharmacy: [id number], 2 cap(s) Oral tid,x7 day(s)  buPROPion 150 mg/24 hours (XL) oral tablet, extended release: 1 tab(s) ( 150 mg ), po, q 24 hrs, # 90 tab(s), 3 Refill(s), Type: Maintenance, Pharmacy: [id number], 1 tab(s) Oral q 24 hrs  dilTIAZem 240 mg/24 hours oral capsule, extended release: = 1 cap(s) ( 240 mg ), PO, Daily, # 90 cap(s), 3 Refill(s), Type: Maintenance, Pharmacy: [id number], 1  cap(s) Oral daily  furosemide 40 mg oral tablet: 1 tab(s) ( 40 mg ), PO, Daily, PRN: as needed, # 90 tab(s), 1 Refill(s), Type: Maintenance, Pharmacy: [facility], 1 tab(s) po daily,PRN:as needed  ibuprofen 600 mg oral tablet: See Instructions, Instructions: TAKE ONE TABLET BY MOUTH EVERY EIGHT HOURS AS NEEDED FOR PAIN, # 90 tab(s), 2 Refill(s), Type: Soft Stop, Pharmacy: [id number], TAKE ONE TABLET BY MOUTH EVERY EIGHT HOURS AS NEEDED FOR PAIN  losartan 100 mg oral tablet: 0.5 tab, po, daily, # 90 tab(s), 3 Refill(s), Type: Maintenance, Pharmacy: [facility]  omeprazole 20 mg oral delayed release capsule: = 1 cap(s) ( 20 mg ), po, daily, # 90 cap(s), 3 Refill(s), Type: Maintenance, Pharmacy: [id number], 1 cap(s) Oral daily  Documented Medications  Documented  PreserVision: po, bid, 0 Refill(s), Type: Maintenance  Vitamin B Complex 100: 1tab, daily, 0 Refill(s), Type: Maintenance  Vitamin D3 1000 intl units oral capsule: 1 cap(s) ( 1,000 International Unit ), po, daily, 0 Refill(s), Type: Maintenance  aspirin 81 mg oral tablet: 1 tab(s) ( 81 mg ), PO, Daily, # 90 tab(s), 0 Refill(s), Type: Maintenance  mometasone 0.1% topical cream: Topical, daily, 0 Refill(s), Type: Maintenance  turmeric: ( 500 mg ), daily, 0 Refill(s), Type: Maintenance   Problem list:    All Problems  Morbid obesity / SNOMED CT 130257807 / Probable  Moderate major depression / SNOMED CT [id number] / Confirmed  Hypertension / SNOMED CT 8556110655 / Confirmed  Hypercholesteremia / SNOMED CT 979449520 / Confirmed  Changing skin lesion / SNOMED CT 9710076766 / Confirmed      Histories   Past Medical History:    Active  Hypertension (SNOMED CT 2288309254)  Hypercholesteremia (SNOMED CT 602121596)  Morbid obesity (SNOMED CT 172152952)  Moderate major depression (SNOMED CT K8A0337D-493M-[id number]-TO89-1YRC038UF909)  Changing skin lesion (SNOMED CT 1251813487)  Resolved  Sleep apnea  (SNOMED CT 882845520):  Resolved on 3/8/2017 at 66 years.  Pregnancy (SNOMED CT 456852102):  Resolved on 1983 at 33 years.   Family History:    MI  Mother  Father  Melanoma  Mother  Sister  Diabetes mellitus  Brother (Eliud)  CA - Cancer of colon  Uncle (M)  CAD - Coronary artery disease  Mother  Father     Procedure history:    Polysomnogram-Titration Study on 3/31/2015 at 64 Years.  Comments:  2015 7:19 PM CDT - Iyer CMALesli  CPAP titrated to +11cm H2O which yielded AHI of 2.3/hr and eliminated snoring  Colonoscopy ([id number]) on 2013 at 62 Years.  Comments:  2015 8:51 AM CST - Isatu Rodriguez  Repeat in 10 years.  Hx of removal of cyst (V15.29) on 2011 at 60 Years.  Colonoscopy (327492283) on 2008 at 58 Years.  Colonoscopy and biopsy of colon (0590151371) on 2004 at 54 Years.  Total left knee arthroplasty in the month of 2004 at 53 Years.  Excision of corneal lesion ([id number]) on 10/4/1999 at 49 Years.  Comments:  2015 8:55 AM CST - Isatu Rodriguez  Left.  Left knee debridement in  at 47 Years.  Incidental appendectomy ([id number]) in  at 42 Years.  D&C - Dilatation and curettage (4580428692) in  at 35 Years.  Hysterectomy ([id number]) in  at 33 Years.   section (55311026) in  at 33 Years.  Tonsillectomy (461642052).      Physical Examination   Vital Signs   2019 9:37 AM CST Temperature Tympanic 98.1 DegF    Peripheral Pulse Rate 80 bpm    HR Method Manual    Systolic Blood Pressure 134 mmHg  HI    Diastolic Blood Pressure 80 mmHg    Mean Arterial Pressure 98 mmHg    BP Method Manual      Measurements from flowsheet : Measurements   2019 9:37 AM CST Height Measured - Standard 61.25 in    Weight Measured - Standard 238.2 lb    BSA 2.16 m2    Body Mass Index 44.64 kg/m2  HI      General:  Alert and oriented, No acute distress.    Eye:  Pupils are equal, round and reactive to light, Normal conjunctiva.    HENT:  Normocephalic, Oral  mucosa is moist, No pharyngeal erythema.    Neck:  Supple, Non-tender, No lymphadenopathy.    Respiratory:  Lungs are clear to auscultation.    Cardiovascular:  Normal rate, Regular rhythm.       Review / Management   Results review:  Lab results   10/3/2018 10:42 AM CDT Sodium Level 141 mmol/L    Potassium Level 4.9 mmol/L    Chloride Level 104 mmol/L    CO2 Level 30 mmol/L    Glucose Level 95 mg/dL    BUN 20 mg/dL    Creatinine Level 0.65 mg/dL    BUN/Creat Ratio NOT APPLICABLE    eGFR 91 mL/min/1.73m2    eGFR African American 106 mL/min/1.73m2    Calcium Level 9.8 mg/dL    Cholesterol 246 mg/dL  HI    Non-HDL Cholesterol 189  HI    HDL 57 mg/dL    Cholesterol/HDL Ratio 4.3      HI    Triglyceride 158 mg/dL  HI    WBC 6.6    RBC 4.59    Hgb 11.3 gm/dL  LOW    Hct 37.1 %    MCV 80.8 fL    MCH 24.6 pg  LOW    MCHC 30.5 gm/dL  LOW    RDW 15.3 %  HI    Platelet 279    MPV 9.9 fL     .       Impression and Plan   Diagnosis     Hypercholesteremia (AOC48-YK E78.00).     Hypertension (LXK61-EU I10).     Moderate major depression (PDT10-UX F32.1).     Course:  Progressing as expected.    Plan:  Enroll in exercise program, Monitor blood pressure, Weight monitoring.         Diet: Sodium restricted.    Orders     Orders (Selected)   Outpatient Orders  Ordered  Return to Clinic (Request): RFV: AWV with fasting labs due, Return in 9 months  Prescriptions  Prescribed  dilTIAZem 240 mg/24 hours oral capsule, extended release: = 1 cap(s) ( 240 mg ), PO, Daily, # 90 cap(s), 3 Refill(s), Type: Maintenance, Pharmacy: [id number], 1 cap(s) Oral daily  furosemide 40 mg oral tablet: = 1 tab(s) ( 40 mg ), PO, Daily, PRN: as needed, # 90 tab(s), 1 Refill(s), Type: Maintenance, Pharmacy: [facility], 1 tab(s) Oral daily,PRN:as needed  losartan 100 mg oral tablet: 0.5 tab, po, daily, # 90 tab(s), 3 Refill(s), Type: Maintenance, Pharmacy: [facility] [id number], 0.5 tab Oral daily  omeprazole 20 mg  oral delayed release capsule: = 1 cap(s) ( 20 mg ), po, daily, # 90 cap(s), 3 Refill(s), Type: Maintenance, Pharmacy: [facility] [address], 1 cap(s) Oral daily  Documented Medications  Documented  mometasone 0.1% topical cream: Topical, daily, 0 Refill(s), Type: Maintenance.       Diagnosis     Diverticulosis (UHL57-MP K57.90).     Course:  has had recurrent diverticulitis, currently asymptomatic, consider referral to general surgery if ongoing symptoms to consider further evaluation. Will have abx on file in case symptoms return..

## 2022-02-16 NOTE — TELEPHONE ENCOUNTER
---------------------  From: Shameka Perkins MA (eRx Pool ([id number]_[place] [facility] [place]))   To: Kt Seo MD;     Sent: 7/8/2019 8:57:03 AM CDT  Subject: FW: Medication Management   Due Date/Time: 7/8/2019 8:15:00 PM CDT     PCP:   CHARLES    Medication:   Ibuprofen  Last Filled:  7/30/18   Quantity:  90  Refills:  2    Date of last office visit and reason:   4/25/19 Rash  Date of last labs pertaining to condition:  N/A    Note:  _    Return to Clinic order placed?  Yes            ------------------------------------------  From: [facility] [id number]  To: Kt Seo MD  Sent: July 7, 2019 8:15:53 PM CDT  Subject: Medication Management  Due: July 8, 2019 8:15:53 PM CDT    ** On Hold Pending Signature **  Drug: ibuprofen (ibuprofen 600 mg oral tablet)  TAKE 1 TABLET BY MOUTH EVERY 8 HOURS AS NEEDED FOR PAIN  Quantity: 90 unknown unit  Days Supply: 30        Refills: 0  Substitutions Allowed  Notes from Pharmacy:     Dispensed Drug: ibuprofen (ibuprofen 600 mg oral tablet)  TAKE 1 TABLET BY MOUTH EVERY 8 HOURS AS NEEDED FOR PAIN  Quantity: 90 tab(s)     Days Supply: 30        Refills: 0  Substitutions Allowed  Notes from Pharmacy:   ---------------------------------------------------------------  From: Kt Seo MD   To: [facility] [id number]    Sent: 7/8/2019 9:41:57 AM CDT  Subject: FW: Medication Management     ** Submitted: **  Complete:ibuprofen (ibuprofen 600 mg oral tablet)   Signed by Kt Seo MD  7/8/2019 9:41:00 AM    ** Approved with modifications: **  ibuprofen (IBUPROFEN 600MG TABLETS)  TAKE 1 TABLET BY MOUTH EVERY 8 HOURS AS NEEDED FOR PAIN  Qty:  90 tab(s)        Days Supply:  30        Refills:  2          Substitutions Allowed     Route To Pharmacy - [facility] [address]

## 2022-02-16 NOTE — TELEPHONE ENCOUNTER
"---------------------  From: Tray ZAVALAChandrika (Phone Messages Pool ([id number]_[facility]))   To: [facility] Message Pool ([id number]_[facility]);     Sent: 2021 11:16:58 AM CDT  Subject: vitals     Phone Message    PCP:   CHARLES asked for [facility]      Time of Call:  11:03am       Person Calling:  pt  Phone number:  [phone]    Note:   Pt LM stating she seen VANESSA yesterday and was to call back with her information from her visit at [place].  Pt says she was seen 3/30 at [place] and BP was 124/74. Height 5'1\", weight 229lbs, temp 98.4, respirations 18.    Pt says CHARLES is PCP and she can call her to follow up on Thursday or Friday.    Pt also concerned about \"false information\" being given out about covid vaccines. Pt wants clinic to be aware that the covid 19 vaccine is not for people under 40 years old. She says a nursing mother in [place] received the vaccine, nursed her baby and baby  within 24 hours. Pt also says pregnant women should not be receiving vaccine because it causes miscarriage. Pt also \"heard\" that vaccine will soon be given to 2 years+ and that should not happen because it causes sterility.     Phone cut out and no other information was obtained in message.    Last office visit and reason:  21 telephone encounter 2, head cold, edemaDave, would you like me to forward this to DAILLO?  Cornell Rai CMa---------------------  From: Cornell Rai CMA ([facility] Message Pool ([id number]_[facility]))   To: Cole Hampton PA-C;     Sent: 2021 11:48:25 AM CDT  Subject: FW: vitals---------------------  From: Cole Hampton PA-C   To: Gabbi OAKES [facility];     Sent: 2021 11:58:17 AM CDT  Subject: FW: vitals     FYI, for what it's worthPlease let patient know that I am not in clinic after tomorrow. Thanks---------------------  From: Kt Seo MD   To: Memo DAUGHERTY, Cole ZURITA;     Sent: 2021 1:19:57 PM CDT  Subject: RE: vitals---------------------  From: Kt Seo MD   To: [facility] Message Pool " ([id number]_[facility]);     Sent: 4/27/2021 1:20:40 PM CDT  Subject: FW: Roverto spoke to Dianna-she doesnt believe in vaccinating and she feels we should not be targeting a young age group in addition there is no point for young children wearing masks. I told her ultimately everyone is entitled there own opinion and we will just leave it at that. I told her you are very busy this afternoon and then you will be on vacation-she wanted to discuss her possible sinus infection and I advised she would need to schedule a phone or video visit for this. She agreed.---------------------  From: Lisa Irving CMA ([employer] Message Pool ([id number]_[facility]))   To: Kt Seo MD;     Sent: 4/28/2021 1:07:49 PM CDT  Subject: RE: Victor M. Thanks

## 2022-02-16 NOTE — LETTER
(Inserted Image. Unable to display)   June 18, 2019      CARLINE SINGH  [address]   [place], [place] [id number]        Dear CARLINE,      Thank you for selecting [facility] ([facility]viously [facility] [place] [place]) for your healthcare needs.     Our records indicate you are due for the following services:     Your provider has recommended you have the preventative service for a SCREENING MAMMOGRAM.  Please schedule at your earliest convenience.    [facility] are dedicated to providing excellent care that is most cost effective for our patients.  *Please contact your insurance company regarding approved providers*    [facility]:  [phone]   [facility]:   [phone]    [facility]     [phone]   [facility]    [facility]    [phone]  [facility])  [phone]  [facility]   [phone]  [facility]     [phone]       [facility])      [phone]        Powered by [address]    Sincerely,    Kt Seo M.D.

## 2022-02-16 NOTE — PROGRESS NOTES
Patient:   CARLINE SINGH            MRN: [id number]            FIN: [id number]               Age:   67 years     Sex:  Female     :  1950   Associated Diagnoses:   Diverticulitis of sigmoid colon   Author:   Kt Seo MD      Chief Complaint   2018 2:23 PM CDT     f/u [facility] ER - abdominal pain      History of Present Illness   Patient here to follow up on abdominal pain.   ER records from [place]  and  reviewed. First visit suspected UTI but did not improve.  Had CT at ER that showed mild diverticulitis.   Having some ongoing diarrhea but improved with imodium x one dose.   No fevers. Not vomiting. Poor appetite.   Records reviewed - was not startedon abx but once CT results were reviewed, abx were recommended.      Health Status   Allergies:    Allergic Reactions (All)  Severity Not Documented  Ciprofloxacin (No reactions were documented)  Penicillin (No reactions were documented)   Medications:  (Selected)   Prescriptions  Prescribed  Replacement CPAP +12cm H2o: Replacement CPAP +12cm H2o, See Instructions, Instructions: CPAP machine, humidifier, heated tubing, filters, nasal or full face mask with cushion replacment, Supply, # 1 EA, 0 Refill(s), Type: Maintenance  buPROPion 150 mg/24 hours (XL) oral tablet, extended release: 1 tab(s) ( 150 mg ), po, q 24 hrs, # 90 tab(s), 3 Refill(s), Type: Maintenance, Pharmacy: [email or web address] #[id number], 1 tab(s) po q 24 hrs  dilTIAZem 240 mg/24 hours oral capsule, extended release: 1 cap(s) ( 240 mg ), PO, Daily, # 90 cap(s), 3 Refill(s), Type: Maintenance, Pharmacy: [email or web address] #[id number], 1 cap(s) po daily  furosemide 40 mg oral tablet: 1 tab(s) ( 40 mg ), PO, Daily, PRN: as needed, # 90 tab(s), 1 Refill(s), Type: Maintenance, Pharmacy: [id number], 1 tab(s) po daily,PRN:as needed  ibuprofen 600 mg oral tablet: 1 tab(s) ( 600 mg ), po, q8 hrs, PRN: as needed for pain, # 90 tab(s), 1 Refill(s), Type: Soft Stop, Pharmacy: [facility], 1 tab(s) po q8 hrs,PRN:as needed for pain  ketoconazole 2% topical shampoo: 1 deep, TOP, 2x/Wk, # 120 mL, 11 Refill(s), Type: Maintenance, Pharmacy: [facility], 1 deep top 2x/wk  losartan 100 mg oral tablet: 1 tab(s) ( 100 mg ), po, daily, # 90 tab(s), 3 Refill(s), Type: Maintenance, Pharmacy: [facility] #[id number], 1 tab(s) po daily  omeprazole 20 mg oral delayed release capsule: 1 cap(s) ( 20 mg ), po, daily, # 90 cap(s), 3 Refill(s), Type: Maintenance, Pharmacy: [facility], 1 cap(s) po daily  Documented Medications  Documented  Josefina-C 500 mg oral tablet: ( 500 mg ), po, daily, 0 Refill(s), Type: Maintenance  Fish Oil 1200 mg oral capsule: 1 cap(s) ( 1,200 mg ), po, daily, 0 Refill(s), Type: Maintenance  Hair, Skin & Nails: ( 5 mg ), po, daily, 0 Refill(s), Type: Maintenance  Kelp,B10,Cider vinegar,Lecithin: Kelp,B10,Cider vinegar,Lecithin, daily, Supply, 0 Refill(s), Type: Maintenance  PreserVision: po, bid, 0 Refill(s), Type: Maintenance  Vitamin B Complex 100: 1tab, daily, 0 Refill(s), Type: Maintenance  Vitamin C 500 mg oral capsule: 1 cap(s) ( 500 mg ), PO, Daily, # 30 cap(s), 0 Refill(s), Type: Maintenance  Vitamin D3 1000 intl units oral capsule: 1 cap(s) ( 1,000 International Unit ), po, daily, 0 Refill(s), Type: Maintenance  aspirin 81 mg oral tablet: 1 tab(s) ( 81 mg ), PO, Daily, # 90 tab(s), 0 Refill(s), Type: Maintenance  lutein 20 mg oral tablet: 1 tab(s) ( 20 mg ), po, daily, 0 Refill(s), Type: Maintenance  methylsulfonylmethane: 0 Refill(s), Type: Maintenance  turmeric: ( 500 mg ), daily, 0 Refill(s), Type: Maintenance   Problem list:    All Problems (Selected)  Changing skin lesion / SNOMED CT 0912162496 / Confirmed  Hypertension / SNOMED CT 5107274612 / Confirmed  Moderate major depression / SNOMED CT [id number] / Confirmed  Morbid obesity / SNOMED CT 054575754 / Probable  Hypercholesteremia / SNOMED CT 174669082 / Confirmed       Histories   Past Medical History:    Active  Hypertension (SNOMED CT 6230382149)  Hypercholesteremia (SNOMED CT 613951973)  Morbid obesity (SNOMED CT 277627431)  Moderate major depression (SNOMED CT [id number]-[id number])  Resolved  Sleep apnea (SNOMED CT 614075756):  Resolved on 3/8/2017 at 66 years.  Pregnancy (SNOMED CT 896579631):  Resolved on 1983 at 33 years.   Family History:    MI  Mother  Father  Melanoma  Mother  Sister  Diabetes mellitus  Brother (Eliud)  CA - Cancer of colon  Uncle (M)  CAD - Coronary artery disease  Mother  Father     Procedure history:    Polysomnogram-Titration Study on 3/31/2015 at 64 Years.  Comments:  2015 7:19 PM - Lesli Iyer CMA  CPAP titrated to +11cm H2O which yielded AHI of 2.3/hr and eliminated snoring  Colonoscopy (708745663) on 2013 at 62 Years.  Comments:  2015 8:51 AM - Isatu Rodriguez  Repeat in 10 years.  Hx of removal of cyst (V15.29) on 2011 at 60 Years.  Colonoscopy (281345774) on 2008 at 58 Years.  Colonoscopy and biopsy of colon (6796425410) on 2004 at 54 Years.  Total left knee arthroplasty in the month of 2004 at 53 Years.  Excision of corneal lesion (576588720) on 10/4/1999 at 49 Years.  Comments:  2015 8:55 AM - Isatu Rodriguez  Left.  Left knee debridement in  at 47 Years.  Incidental appendectomy (813469606) in  at 42 Years.  D&C - Dilatation and curettage (8904664885) in  at 35 Years.  Hysterectomy (474404682) in  at 33 Years.   section (66270516) in  at 33 Years.  Tonsillectomy (468462229).      Physical Examination   Vital Signs   2018 2:23 PM CDT Temperature Tympanic 98.4 DegF    Peripheral Pulse Rate 88 bpm    Pulse Site Radial artery    HR Method Manual    Systolic Blood Pressure 138 mmHg  HI    Diastolic Blood Pressure 74 mmHg    Mean Arterial Pressure 95 mmHg    BP Site Right arm    BP Method Manual      Measurements from flowsheet : Measurements   2018  2:23 PM CDT Height Measured - Standard 61.5 in    Weight Measured - Standard 224.2 lb    BSA 2.1 m2    Body Mass Index 41.67 kg/m2  HI      General:  Alert and oriented, No acute distress.    Eye:  Pupils are equal, round and reactive to light, Normal conjunctiva.    HENT:  Normocephalic, Oral mucosa is moist, No pharyngeal erythema.    Neck:  Supple, Non-tender, No lymphadenopathy.    Respiratory:  Lungs are clear to auscultation.    Cardiovascular:  Normal rate, Regular rhythm.    Gastrointestinal:  Soft, Non-distended, Normal bowel sounds, lower abdominal tenderness.       Impression and Plan   Diagnosis     Diverticulitis of sigmoid colon (CMQ13-BN K57.32).     Course:  Not improving.    Orders     Orders (Selected)   Prescriptions  Prescribed  Bactrim  mg-160 mg oral tablet: 1 tab(s), PO, BID, # 20 tab(s), 0 Refill(s), Type: Maintenance, Pharmacy: [facility] #[id number], 1 tab(s) po bid,x10 day(s)  metroNIDAZOLE 500 mg oral tablet: 1 tab(s) ( 500 mg ), PO, q8hr, # 30 tab(s), 0 Refill(s), Type: Maintenance, Pharmacy: [facility] #[id number], 1 tab(s) po q8 hrs,x10 day(s).     Discussed bland diet. If symptoms resolve, follow up in one month for recheck and to discuss referral for colonoscopy. If not improving over next 5-7 days, let me know..

## 2022-02-16 NOTE — TELEPHONE ENCOUNTER
---------------------  From: Pilar Long MA (Phone Messages Pool ([id number]))   To: [facility] Message Pool ([id number]);     Sent: 7/26/2021 4:14:50 PM CDT  Subject: Skin redness post tick bite     Phone Message    PCP:   CHARLES      Time of Call:  1605       Person Calling:  pt  Phone number:  [phone]    Reason for call:  pt c/o having itching to lower back and was found to have woodtick to area over the weekend.  Pt now c/o triangular shaped reddened area to lower back and was wondering if rx can be sent in to [facility] before it becomes more problematic.  Please advise--pt informed [facility] until 7/28/2021  Returned call at: _    Note:   _    Last office visit and reason:  4/26/2021 w/ DWG for URI sx    Transferred to: _Please check with patient to see how she is doing and make sure it was a wood tick and that Lyme testing isn't needed. If it was definitely a wood tick, would recommend  keflex 500mg QID x 10 days which can be sent to [facility].---------------------  From: Kt Seo MD ([facility] Message Pool ([id number]))   To: [facility] Message Pool ([id number]);     Sent: 7/28/2021 9:14:46 AM CDT  Subject: RE: Skin redness post tick biteSpoke to pt and informed her of message. Tick bite improving but pt would still like to try the antibiotic. Denies any aching or soreness. Declines lymes at this time. Rx sent to pharmacy.

## 2022-02-16 NOTE — NURSING NOTE
Carlos Eduardo Graff Vagifem is not covered by patient's insurance.  When doing the Covermymeds prior authorization Estrace and Premarin cream are covered in her plan.  Per CHARLES Estrace cream would be the preferred medication. Patient contacted and she agreed to try different medication.

## 2022-02-16 NOTE — TELEPHONE ENCOUNTER
---------------------  From: Sylvia Horvath CMA (Phone Messages Pool ([id number]_[place] [facility] [place]))   To: Kt Seo MD;     Sent: 6/3/2019 9:31:28 AM CDT  Subject: phone note- multiple     Phone Message    PCP:    CHARLES      Time of Call:  9:25am       Person Calling:  Dianna  Phone number:  [phone]    Returned call at: _    Note:  Patient called for med refill request on Nystop topical powder. States what she had cleared up, but would like to have on hand for the summer.     Also requesting to speak with KW regarding Diverticulitis flare up.    Last office visit and reason:  4/25/19 sinusCalled patient at 106pm. Will refill nystop.  Patient also having recurrence of diverticulitis symptoms, with pain. No fevers. No blood in stools.  Will schedule for CT abdomen in [place] per her request.  Will let me know if worsening so that we can start antibiotics.Please print CT order and fax to referrals. Requests [facility].---------------------  From: Kt Seo MD   To: Phone Messages Pool ([id number]_[place] [facility]ick[facility]);     Sent: 6/3/2019 1:11:09 PM CDT  Subject: RE: phone note- multipleOrder faxed to referrals, wrot that patient request [facility]

## 2022-02-16 NOTE — PROGRESS NOTES
Patient:   CARLINE SINGH            MRN: [id number]            FIN: [id number]               Age:   68 years     Sex:  Female     :  1950   Associated Diagnoses:   Right foot pain   Author:   Joanie DAUGHERTY, Renaldo      Report Summary   Diagnosis  Right foot pain (HHS14-WD M79.671).  Patient Instructions   Visit Information   Visit type:  General concerns.    Accompanied by:  No one.    Source of history:  Self, Medical record.    Referral source:  Self.    History limitation:  None.       Chief Complaint   2019 1:24 PM CDT    right foot pain; no known injury; pain started in July; foot has been spasming      History of Present Illness             The patient presents with pain and not injury.  The symptom(s) is described as pain, swelling and ecchymosis.  The location of symptom(s) is the right and foot.  The severity of the symptom(s) is moderate.  The timing/course of symptom(s) is episodic, fluctuates in intensity and is worsening.  The symptom(s) has lasted for 2.5 month(s).  Radiation of pain: none.  No known injury. Pain started in arch, now lateral aspect of the foot. Did get ecchymotic, that is resolving. No fever or chills. Has severe pes planus. No other joint pain. No rash..        Review of Systems   Constitutional:  Negative.    Musculoskeletal:  Negative except as documented in history of present illness.    Integumentary:  Negative except as documented in history of present illness.    Neurologic:  Negative.       Health Status   Allergies:    Allergic Reactions (All)  Severity Not Documented  Ciprofloxacin (No reactions were documented)  Penicillin (No reactions were documented)   Medications:  (Selected)   Prescriptions  Prescribed  Bactrim  mg-160 mg oral tablet: 1 tab(s), PO, BID, # 20 tab(s), 0 Refill(s), Type: Maintenance, Pharmacy: [id number], 1 tab(s) Oral bid,x10 day(s)  Bactrim  mg-160 mg oral tablet: 1 tab(s), PO, BID, # 20 tab(s), 0 Refill(s), Type:  Maintenance, Pharmacy: [facility] [address], 1 tab(s) Oral bid,x10 day(s)  IBUPROFEN 600MG TABLETS: 1 tab(s), Oral, q8 hrs, PRN: AS NEEDED FOR PAIN, # 90 tab(s), Type: Soft Stop, Pharmacy: [facility] [address]  Replacement CPAP +12cm H2o: Replacement CPAP +12cm H2o, See Instructions, Instructions: CPAP machine, humidifier, heated tubing, filters, nasal or full face mask with cushion replacment, Supply, # 1 EA, 0 Refill(s), Type: Maintenance  Tessalon Perles 100 mg oral capsule: = 2 cap(s) ( 200 mg ), PO, TID, # 42 cap(s), 0 Refill(s), Type: Maintenance, Pharmacy: [facility] #[id number], 2 cap(s) Oral tid,x7 day(s)  buPROPion 150 mg/24 hours (XL) oral tablet, extended release: 1 tab(s) ( 150 mg ), po, q 24 hrs, # 90 tab(s), 3 Refill(s), Type: Maintenance, Pharmacy: [facility] #[id number], 1 tab(s) Oral q 24 hrs  dilTIAZem 240 mg/24 hours oral capsule, extended release: = 1 cap(s) ( 240 mg ), PO, Daily, # 90 cap(s), 3 Refill(s), Type: Maintenance, Pharmacy: [facility], 1 cap(s) Oral daily  furosemide 40 mg oral tablet: = 1 tab(s) ( 40 mg ), PO, Daily, PRN: as needed, # 90 tab(s), 1 Refill(s), Type: Maintenance, Pharmacy: [facility] [address], 1 tab(s) Oral daily,PRN:as needed  ibuprofen 600 mg oral tablet: 1 tab(s), Oral, q8 hrs, PRN: AS NEEDED FOR PAIN, # 90 tab(s), 2 Refill(s), Type: Soft Stop, Pharmacy: [facility] [address]  losartan 100 mg oral tablet: 0.5 tab, po, daily, # 90 tab(s), 3 Refill(s), Type: Maintenance, Pharmacy: [facility] [address], 0.5 tab Oral daily  metroNIDAZOLE 500 mg oral tablet: = 1 tab(s) ( 500 mg ), PO, q8hr, # 30 tab(s), 0 Refill(s), Type: Maintenance, Pharmacy: [facility] [address], 1 tab(s) Oral q8 hrs,x10 day(s)  metroNIDAZOLE 500 mg oral tablet: = 1 tab(s) ( 500 mg ), PO, q8hr, # 30 tab(s), 0 Refill(s), Type: Maintenance, [facility], 1 tab(s) Oral q8 hrs,x10 day(s)  nystatin 100,000 units/g topical powder: 1  deep, TOP, TID, Instructions: apply to affected area until healed, # 30 g, 5 Refill(s), Type: Maintenance, Pharmacy: [facility], 1 deep Topical tid,Instr:apply to affected area until healed  omeprazole 20 mg oral delayed release capsule: = 1 cap(s) ( 20 mg ), po, daily, # 90 cap(s), 3 Refill(s), Type: Maintenance, Pharmacy: [facility], 1 cap(s) Oral daily  Documented Medications  Documented  PreserVision: po, bid, 0 Refill(s), Type: Maintenance  Vitamin B Complex 100: 1tab, daily, 0 Refill(s), Type: Maintenance  Vitamin D3 1000 intl units oral capsule: 1 cap(s) ( 1,000 International Unit ), po, daily, 0 Refill(s), Type: Maintenance  aspirin 81 mg oral tablet: 1 tab(s) ( 81 mg ), PO, Daily, # 90 tab(s), 0 Refill(s), Type: Maintenance  mometasone 0.1% topical cream: Topical, daily, 0 Refill(s), Type: Maintenance  turmeric: ( 500 mg ), daily, 0 Refill(s), Type: Maintenance   Problem list:    All Problems  Morbid obesity / SNOMED CT 864265120 / Probable  Moderate major depression / SNOMED CT [id number] / Confirmed  Hypertension / SNOMED CT 3321878767 / Confirmed  Hypercholesteremia / SNOMED CT 942771584 / Confirmed  Changing skin lesion / SNOMED CT 3439933874 / Confirmed      Histories   Past Medical History:    Active  Hypertension (8406376100)  Hypercholesteremia (094117975)  Morbid obesity (262689210)  Moderate major depression ([id number])  Changing skin lesion (4635739513)  Resolved  Sleep apnea (649890527):  Resolved on 3/8/2017 at 66 years.  Pregnancy (707612027):  Resolved on 11/26/1983 at 33 years.   Family History:    MI  Mother  Father  Melanoma  Mother  Sister  Diabetes mellitus  Brother (Eliud)  CA - Cancer of colon  Uncle (M)  CAD - Coronary artery disease  Mother  Father     Procedure history:    Polysomnogram-Titration Study on 3/31/2015 at 64 Years.  Comments:  4/8/2015 7:19 PM PADMINIT - Lesli Iyer CMA  CPAP titrated to  +11cm H2O which yielded AHI of 2.3/hr and eliminated snoring  Colonoscopy (438767180) on 2013 at 62 Years.  Comments:  2015 8:51 AM Isatu Forrester  Repeat in 10 years.  Hx of removal of cyst (V15.29) on 2011 at 60 Years.  Colonoscopy (227796174) on 2008 at 58 Years.  Colonoscopy and biopsy of colon (4972523833) on 2004 at 54 Years.  Total left knee arthroplasty in the month of 2004 at 53 Years.  Excision of corneal lesion (077106855) on 10/4/1999 at 49 Years.  Comments:  2015 8:55 AM Isatu Forrester  Left.  Left knee debridement in  at 47 Years.  Incidental appendectomy (931807913) in  at 42 Years.  D&C - Dilatation and curettage (5907449674) in  at 35 Years.  Hysterectomy (000283621) in  at 33 Years.   section (20797682) in  at 33 Years.  Tonsillectomy (807109636).   Social History:        Alcohol Assessment            Past                     Comments:                      2017 - Hanna Francis LPN                     Denies alcohol use.      Tobacco Assessment: Denies Tobacco Use            Never      Substance Abuse Assessment: Denies Substance Abuse            Never      Employment and Education Assessment            Retired      Home and Environment Assessment            Marital status:  (Living together).  Spouse/Partner name: Marvel.  Lives with Spouse.  4 children.               Living situation: Home/Independent.      Nutrition and Health Assessment            Type of diet: Weight Watchers.      Exercise and Physical Activity Assessment: Regular exercise            Exercise frequency: 3-4 times/week.  Exercise type: Water aerobics.      Sexual Assessment            Sexually active: Yes.  Sexual orientation: Heterosexual.  Other contraceptive use: Hysterectomy.      Other Assessment            First menses age 13.  Regular menses.                     Comments:                      2015 - Iastu Rodriguez                      Wears glasses.        Physical Examination   Vital Signs   8/16/2019 1:24 PM CDT Peripheral Pulse Rate 87 bpm    Systolic Blood Pressure 132 mmHg  HI    Diastolic Blood Pressure 70 mmHg    Mean Arterial Pressure 91 mmHg    Oxygen Saturation 95 %      Measurements from flowsheet : Measurements   8/16/2019 1:24 PM CDT Height Measured - Standard 61.25 in    Weight Measured - Standard 231.6 lb    BSA 2.13 m2    Body Mass Index 43.4 kg/m2  HI      Cardiovascular:       Arterial pulses: Right, Posterior tibial, Dorsalis pedis, 2+.    Musculoskeletal:  Normal range of motion, Tender in both the medial and lateral mid and hind foot. .    Integumentary:  No rash.    Neurologic:  No focal deficits.       Review / Management   Radiology results    waiting for official read.  Will contact patient with any other findings. Some OA. No acute findings.      Impression and Plan   Diagnosis     Right foot pain (GLV80-XS M79.671).     Patient Instructions:       Counseled: Patient, Regarding diagnosis, Activity.    Offered CAM Walker, she declines. Elevate, compression. RTC in seven days if not gradually improved, or prior if worse.

## 2022-02-16 NOTE — LETTER
(Inserted Image. Unable to display)   January 16, 2019      CARLINE SINGH  PO   [place], [place] [id number]        Dear CARLINE,      Thank you for selecting [facility] (previously [facility]) for your healthcare needs.     Our records indicate you are due for the following services:     Clinical Support Staff (CSS)-Only Blood Pressure Check ~ Please stop in anytime to have your blood pressure rechecked. This is a free service and no appointment necessary.    So we can best determine if your medications are effective in lowering your blood pressure, please take your medications 1-2 hours before coming in to have your blood pressure checked.  We encourage you to avoid caffeine or other stimulants prior to having your blood pressure checked and come at a time when you are not feeling rushed.     If you check your blood pressure at home, please bring in your blood pressure monitor and home blood pressure readings.  We will check your machine for accuracy and also share your home readings with your Healthcare Provider.     To schedule an appointment or if you have further questions, please contact your primary clinic:   [facility]          [phone]   [facility]    [phone]             [facility]         [phone]      Powered by [employer]    Sincerely,    Kt Seo M.D.

## 2022-02-16 NOTE — NURSING NOTE
Comprehensive Intake Entered On:  8/16/2019 1:26 PM CDT    Performed On:  8/16/2019 1:24 PM CDT by Tracy Stafford CMA   Systolic Blood Pressure :   132 mmHg (HI)    Diastolic Blood Pressure :   70 mmHg   Mean Arterial Pressure :   91 mmHg   Peripheral Pulse Rate :   87 bpm   Oxygen Saturation :   95 %   Tracy Stafford CMA - 8/16/2019 1:26 PM CDT   Chief Complaint :   right foot pain; no known injury; pain started in July; foot has been spasming   Menstrual Status :   Hysterectomy   Weight Measured :   231.6 lb(Converted to: 231 lb 10 oz, 105.05 kg)    Height Measured :   61.25 in(Converted to: 5 ft 1 in, 155.57 cm)    Body Mass Index :   43.4 kg/m2 (HI)    Body Surface Area :   2.13 m2   Tracy Stafford CMA - 8/16/2019 1:24 PM CDT   Health Status   Allergies Verified? :   Yes   Medication History Verified? :   Yes   Immunizations Current :   Yes   Medical History Verified? :   Yes   Pre-Visit Planning Status :   Completed   Tobacco Use? :   Never smoker   Tracy Stafford CMA - 8/16/2019 1:24 PM CDT   Consents   Consent for Immunization Exchange :   Consent Granted   Consent for Immunizations to Providers :   Consent Granted   Tracy Stafford CMA - 8/16/2019 1:24 PM CDT   Meds / Allergies   (As Of: 8/16/2019 1:26:34 PM CDT)   Allergies (Active)   ciprofloxacin  Estimated Onset Date:   Unspecified ; Created By:   Isatu Rodrigeuz; Reaction Status:   Active ; Category:   Drug ; Substance:   ciprofloxacin ; Type:   Allergy ; Updated By:   Isatu Rodriguez; Reviewed Date:   8/16/2019 1:25 PM CDT      penicillin  Estimated Onset Date:   Unspecified ; Created By:   Hanna Francis LPN; Reaction Status:   Active ; Category:   Drug ; Substance:   penicillin ; Type:   Allergy ; Updated By:   Hanna Francis LPN; Reviewed Date:   8/16/2019 1:25 PM CDT        Medication List   (As Of: 8/16/2019 1:26:34 PM CDT)   Prescription/Discharge Order    benzonatate  :   benzonatate ; Status:   Prescribed ; Ordered As Mnemonic:    Tessalon Perles 100 mg oral capsule ; Simple Display Line:   200 mg, 2 cap(s), PO, TID, for 7 day(s), 42 cap(s), 0 Refill(s) ; Ordering Provider:   Renaldo Nair PA-C; Catalog Code:   benzonatate ; Order Dt/Tm:   12/20/2018 11:22:32 AM          buPROPion  :   buPROPion ; Status:   Prescribed ; Ordered As Mnemonic:   buPROPion 150 mg/24 hours (XL) oral tablet, extended release ; Simple Display Line:   150 mg, 1 tab(s), po, q 24 hrs, 90 tab(s), 3 Refill(s) ; Ordering Provider:   Kt Seo MD; Catalog Code:   buPROPion ; Order Dt/Tm:   10/3/2018 9:25:53 AM          dilTIAZem  :   dilTIAZem ; Status:   Prescribed ; Ordered As Mnemonic:   dilTIAZem 240 mg/24 hours oral capsule, extended release ; Simple Display Line:   240 mg, 1 cap(s), PO, Daily, 90 cap(s), 3 Refill(s) ; Ordering Provider:   Kt Seo MD; Catalog Code:   dilTIAZem ; Order Dt/Tm:   2/27/2019 9:52:45 AM          furosemide  :   furosemide ; Status:   Prescribed ; Ordered As Mnemonic:   furosemide 40 mg oral tablet ; Simple Display Line:   40 mg, 1 tab(s), PO, Daily, PRN: as needed, 90 tab(s), 1 Refill(s) ; Ordering Provider:   Kt Seo MD; Catalog Code:   furosemide ; Order Dt/Tm:   2/27/2019 9:52:44 AM          ibuprofen 600 mg oral tablet  :   ibuprofen 600 mg oral tablet ; Status:   Prescribed ; Ordered As Mnemonic:   ibuprofen 600 mg oral tablet ; Simple Display Line:   1 tab(s), Oral, q8 hrs, PRN: AS NEEDED FOR PAIN, 90 tab(s), 2 Refill(s) ; Ordering Provider:   Kt Seo MD; Catalog Code:   ibuprofen ; Order Dt/Tm:   7/8/2019 9:41:55 AM          losartan  :   losartan ; Status:   Prescribed ; Ordered As Mnemonic:   losartan 100 mg oral tablet ; Simple Display Line:   0.5 tab, po, daily, 90 tab(s), 3 Refill(s) ; Ordering Provider:   Kt Seo MD; Catalog Code:   losartan ; Order Dt/Tm:   2/27/2019 9:52:45 AM          metroNIDAZOLE  :   metroNIDAZOLE ; Status:   Prescribed ; Ordered As Mnemonic:   metroNIDAZOLE 500  mg oral tablet ; Simple Display Line:   500 mg, 1 tab(s), PO, q8hr, for 10 day(s), 30 tab(s), 0 Refill(s) ; Ordering Provider:   Kt Seo MD; Catalog Code:   metroNIDAZOLE ; Order Dt/Tm:   6/6/2019 4:19:21 PM          metroNIDAZOLE  :   metroNIDAZOLE ; Status:   Prescribed ; Ordered As Mnemonic:   metroNIDAZOLE 500 mg oral tablet ; Simple Display Line:   500 mg, 1 tab(s), PO, q8hr, for 10 day(s), 30 tab(s), 0 Refill(s) ; Ordering Provider:   Kt Seo MD; Catalog Code:   metroNIDAZOLE ; Order Dt/Tm:   2/27/2019 10:03:04 AM          Misc Prescription  :   Misc Prescription ; Status:   Prescribed ; Ordered As Mnemonic:   IBUPROFEN 600MG TABLETS ; Simple Display Line:   1 tab(s), Oral, q8 hrs, PRN: AS NEEDED FOR PAIN, 90 tab(s) ; Ordering Provider:   Kt Seo MD; Catalog Code:   Miscellaneous Prescription ; Order Dt/Tm:   3/7/2019 11:26:21 AM          Miscellaneous Rx Supply  :   Miscellaneous Rx Supply ; Status:   Prescribed ; Ordered As Mnemonic:   Replacement CPAP +12cm H2o ; Simple Display Line:   See Instructions, CPAP machine, humidifier, heated tubing, filters, nasal or full face mask with cushion replacment, 1 EA ; Ordering Provider:   Kt Seo MD; Catalog Code:   Miscellaneous Rx Supply ; Order Dt/Tm:   4/16/2015 11:14:19 AM ; Comment:   Months = 99 (Lifetime)          nystatin topical  :   nystatin topical ; Status:   Prescribed ; Ordered As Mnemonic:   nystatin 100,000 units/g topical powder ; Simple Display Line:   1 deep, TOP, TID, apply to affected area until healed, 30 g, 5 Refill(s) ; Ordering Provider:   Kt Seo MD; Catalog Code:   nystatin topical ; Order Dt/Tm:   6/3/2019 1:08:57 PM          omeprazole  :   omeprazole ; Status:   Prescribed ; Ordered As Mnemonic:   omeprazole 20 mg oral delayed release capsule ; Simple Display Line:   20 mg, 1 cap(s), po, daily, 90 cap(s), 3 Refill(s) ; Ordering Provider:   Kt Seo MD; Catalog Code:   omeprazole ; Order  Dt/Tm:   2/27/2019 9:52:46 AM          sulfamethoxazole-trimethoprim  :   sulfamethoxazole-trimethoprim ; Status:   Prescribed ; Ordered As Mnemonic:   Bactrim  mg-160 mg oral tablet ; Simple Display Line:   1 tab(s), PO, BID, for 10 day(s), 20 tab(s), 0 Refill(s) ; Ordering Provider:   Kt Seo MD; Catalog Code:   sulfamethoxazole-trimethoprim ; Order Dt/Tm:   6/6/2019 4:19:22 PM          sulfamethoxazole-trimethoprim  :   sulfamethoxazole-trimethoprim ; Status:   Prescribed ; Ordered As Mnemonic:   Bactrim  mg-160 mg oral tablet ; Simple Display Line:   1 tab(s), PO, BID, for 10 day(s), 20 tab(s), 0 Refill(s) ; Ordering Provider:   Kt Seo MD; Catalog Code:   sulfamethoxazole-trimethoprim ; Order Dt/Tm:   4/25/2019 9:45:12 AM            Home Meds    aspirin  :   aspirin ; Status:   Documented ; Ordered As Mnemonic:   aspirin 81 mg oral tablet ; Simple Display Line:   81 mg, 1 tab(s), PO, Daily, 90 tab(s) ; Catalog Code:   aspirin ; Order Dt/Tm:   2/18/2015 10:25:30 AM          cholecalciferol  :   cholecalciferol ; Status:   Documented ; Ordered As Mnemonic:   Vitamin D3 1000 intl units oral capsule ; Simple Display Line:   1,000 International Unit, 1 cap(s), po, daily, 0 Refill(s) ; Catalog Code:   cholecalciferol ; Order Dt/Tm:   11/29/2016 12:33:13 PM          mometasone topical  :   mometasone topical ; Status:   Documented ; Ordered As Mnemonic:   mometasone 0.1% topical cream ; Simple Display Line:   Topical, daily, 0 Refill(s) ; Catalog Code:   mometasone topical ; Order Dt/Tm:   2/27/2019 9:41:17 AM          multivitamin  :   multivitamin ; Status:   Documented ; Ordered As Mnemonic:   Vitamin B Complex 100 ; Simple Display Line:   1tab, daily ; Catalog Code:   multivitamin ; Order Dt/Tm:   2/18/2015 10:23:40 AM          multivitamin with minerals  :   multivitamin with minerals ; Status:   Documented ; Ordered As Mnemonic:   PreserVision ; Simple Display Line:   po, bid, 0  Refill(s) ; Catalog Code:   multivitamin with minerals ; Order Dt/Tm:   11/30/2015 9:29:25 AM          turmeric  :   turmeric ; Status:   Documented ; Ordered As Mnemonic:   turmeric ; Simple Display Line:   500 mg, daily, 0 Refill(s) ; Catalog Code:   turmeric ; Order Dt/Tm:   11/29/2016 12:34:06 PM

## 2022-02-16 NOTE — PROGRESS NOTES
Patient:   CARLINE SINGH            MRN: [id number]            FIN: [id number]               Age:   66 years     Sex:  Female     :  1950   Associated Diagnoses:   Skin tag   Author:   Kt Seo MD      Chief Complaint   3/8/2017 9:44 AM CST     c/o skin tags on neck      History of Present Illness   Patient with several skin tags around neck and in armpits. Get caught on collars and bras. Irritated. Would like removed.      Health Status   Allergies:    Allergic Reactions (All)  Severity Not Documented  Ciprofloxacin (No reactions were documented)  Penicillin (No reactions were documented)   Medications:  (Selected)   Prescriptions  Prescribed  Replacement CPAP +12cm H2o: Replacement CPAP +12cm H2o, See Instructions, Instructions: CPAP machine, humidifier, heated tubing, filters, nasal or full face mask with cushion replacment, Supply, # 1 EA, 0 Refill(s), Type: Maintenance  buPROPion 300 mg/24 hours (XL) oral tablet, extended release: 1 tab(s) ( 300 mg ), po, daily, # 90 tab(s), 3 Refill(s), Type: Maintenance, Pharmacy: [id number], 1 tab(s) po daily  diltiaZEM 240 mg/24 hours oral capsule, extended release: 1 cap(s) ( 240 mg ), PO, Daily, # 90 cap(s), 3 Refill(s), Type: Maintenance, Pharmacy: [id number], 1 cap(s) po daily  furosemide 40 mg oral tablet: 1 tab(s) ( 40 mg ), PO, Daily, PRN: as needed, # 90 tab(s), 1 Refill(s), Type: Maintenance, Pharmacy: [id number], 1 tab(s) po daily,PRN:as needed  ibuprofen 600 mg oral tablet: See Instructions, Instructions: TAKE ONE TABLET BY MOUTH EVERY EIGHT HOURS, # 90 tab(s), 1 Refill(s), Type: Soft Stop, Pharmacy: [id number]  losartan 100 mg oral tablet: 1 tab(s) ( 100 mg ), PO, Daily, # 90 tab(s), 3 Refill(s), Type: Maintenance, Pharmacy: [id number], 1 tab(s) po daily  omeprazole 20 mg oral delayed release tablet: 1 tab(s) ( 20 mg ), po, daily, # 90 tab(s), 3 Refill(s), Type: Maintenance, Pharmacy:  [id number], 1 tab(s) po daily  Documented Medications  Documented  Josefina-C 500 mg oral tablet: ( 500 mg ), po, daily, 0 Refill(s), Type: Maintenance  Fiber Complex: Fiber Complex, daily, Supply, 0 Refill(s), Type: Maintenance  Fish Oil 1200 mg oral capsule: 1 cap(s) ( 1,200 mg ), po, daily, 0 Refill(s), Type: Maintenance  Hair, Skin & Nails: ( 5 mg ), po, daily, 0 Refill(s), Type: Maintenance  Hi-Potency Quer cetin: Hi-Potency Quer cetin, daily, Supply, 0 Refill(s), Type: Maintenance  Hormone Essentials: Hormone Essentials, daily, Supply, 0 Refill(s), Type: Maintenance  Kelp,B10,Cider vinegar,Lecithin: Kelp,B10,Cider vinegar,Lecithin, daily, Supply, 0 Refill(s), Type: Maintenance  Lemon Peel-full spectrum: Lemon Peel-full spectrum, daily, Supply, 0 Refill(s), Type: Maintenance  PreserVision: po, bid, 0 Refill(s), Type: Maintenance  Vagifem 10 mcg vaginal tablet: 1 EA ( 10 mcg ), vag, 2x/wk, 0 Refill(s), Type: Maintenance  Vinpocetine-memory support: Vinpocetine-memory support, ( 30 mg ), daily, Supply, 0 Refill(s), Type: Maintenance  Vitamin B Complex 100: 1tab, daily, 0 Refill(s), Type: Maintenance  Vitamin C 500 mg oral capsule: 1 cap(s) ( 500 mg ), PO, Daily, # 30 cap(s), 0 Refill(s), Type: Maintenance  Vitamin D3 1000 intl units oral capsule: 1 cap(s) ( 1,000 International Unit ), po, daily, 0 Refill(s), Type: Maintenance  aspirin 81 mg oral tablet: 1 tab(s) ( 81 mg ), PO, Daily, # 90 tab(s), 0 Refill(s), Type: Maintenance  lutein 20 mg oral tablet: 1 tab(s) ( 20 mg ), po, daily, 0 Refill(s), Type: Maintenance  lysine 500 mg oral tablet: 2 tab(s) ( 1,000 mg ), po, daily, 0 Refill(s), Type: Maintenance  turmeric: ( 500 mg ), daily, 0 Refill(s), Type: Maintenance   Problem list:    All Problems (Selected)  Changing skin lesion / SNOMED CT 2006425705 / Confirmed  Hypercholesteremia / SNOMED CT 259621180 / Confirmed  Hypertension / SNOMED CT 6876307286 / Confirmed  Moderate major depression / SNOMED CT  [id number] / Confirmed  Morbid obesity / SNOMED CT 607542382 / Probable  Sleep apnea / SNOMED CT 695157355 / Confirmed      Histories   Past Medical History:    Active  Hypertension (SNOMED CT 2051352867)  Sleep apnea (SNOMED CT 013157261)  Hypercholesteremia (SNOMED CT 603619165)  Morbid obesity (SNOMED CT 673324000)  Moderate major depression (SNOMED CT [id number])  Resolved  Pregnancy (SNOMED CT 650886901):  Resolved on 1983 at 33 years.   Family History:    MI  Mother  Father  Melanoma  Mother  Sister  Diabetes mellitus  Brother (Eliud)  CA - Cancer of colon  Uncle (M)  CAD - Coronary artery disease  Mother  Father     Procedure history:    Polysomnogram-Titration Study on 3/31/2015 at 64 Years.  Comments:  2015 7:19 PM - Lesli Iyer CMA  CPAP titrated to +11cm H2O which yielded AHI of 2.3/hr and eliminated snoring  Colonoscopy (354380715) on 2013 at 62 Years.  Comments:  2015 8:51 AM - Isatu Rodriguez  Repeat in 10 years.  Hx of removal of cyst (V15.29) on 2011 at 60 Years.  Colonoscopy (588051372) on 2008 at 58 Years.  Colonoscopy and biopsy of colon (5084581637) on 2004 at 54 Years.  Total left knee arthroplasty in the month of 2004 at 53 Years.  Excision of corneal lesion (200385121) on 10/4/1999 at 49 Years.  Comments:  2015 8:55 AM - Isatu Rodriguez  Left.  Left knee debridement in  at 47 Years.  Incidental appendectomy (255542850) in  at 42 Years.  D&C - Dilatation and curettage (6098300196) in  at 35 Years.  Hysterectomy (718323161) in  at 33 Years.   section (39316831) in  at 33 Years.  Tonsillectomy (389030312).      Physical Examination   Vital Signs   3/8/2017 9:44 AM CST Temperature Temporal 96.8 DegF  LOW    Peripheral Pulse Rate 72 bpm    Pulse Site Radial artery    Systolic Blood Pressure 124 mmHg    Diastolic Blood Pressure 78 mmHg    Mean Arterial Pressure 93 mmHg    BP Site  Left arm    BP Method Manual      Measurements from flowsheet : Measurements   3/8/2017 9:44 AM CST Height Measured - Standard 61.5 in    Weight Measured - Standard 236.4 lb    BSA 2.15 m2    Body Mass Index 43.94 kg/m2      General:  Not alert and oriented.    Integumentary:  2 skin tags in each armpit, 2mm tags in right axilla, 3mm tags in left axilla, also has a 3mm tag at base of neck on right side and 2mm skin tag on anterior chest wall. All are flesh colored, no unusual appearance..       Procedure   Dermatology Surgical Procedure   Confirmed: patient, procedure, side, and site are correct, safety procedures followed.     Indication: remove lesion.     Procedure performed: areas cleaned with alcohol and injected with lido 1% with epi, 0.5cc at base of each. Then using sterile pickups and scissors, the 6 skin tags are removed. Treated with drysol to achieve hemostasis..     Complications: none.     Procedure tolerated: well.        Impression and Plan   Diagnosis     Skin tag (OMH00-SQ L91.8).     Course:  6 skin tags removed given irritation from clothing, will let me know if any others start to get inflamed as well.

## 2022-02-16 NOTE — LETTER
(Inserted Image. Unable to display)   November 27, 2019      CARLINEISAAK SINGH  [address]   [place], [place] [id number]        Dear CARLINE,      Thank you for selecting [facility] (previously [facility]) for your healthcare needs.     Our records indicate you are due for the following services:    Annual Physical  Fasting Lab Tests ~ Please do not eat or drink anything 10 hours prior to your scheduled appointment time.                (Water and any medications that you may need are allowed unless directed otherwise.)    If you had your labs done at another facility or with [facility] at [facility], please bring in a copy of the results to your next visit, mail a copy, or drop off a copy of your results to your Healthcare Provider.    You are due for lab work and an office visit; please schedule the lab appointment 1 week before the office visit.  This will assure all results are available to discuss with your provider during your visit.    **It is very helpful if you bring your medication bottles to your appointment.  This assures we have all of your current medications, including strength and dosing information, documented accurately in your medical record.    To schedule an appointment or if you have further questions, please contact your primary clinic:   [facility]  [phone]   [facility]  [phone]             [facility]      [phone]      Powered by [employer]    Sincerely,    Kt Seo M.D.

## 2022-02-16 NOTE — NURSING NOTE
Phone Message    PCP:   CHARLES       Time of Call:  1:35 pm       Person Calling:  pt  Phone number:  [phone]    Returned call at: 1:42 pm    Note:   Pt c/o edema in her legs, starting early mornings. Pt requesting to see DIALLOL this afternoon. Will come at 3:20 today.

## 2022-02-16 NOTE — TELEPHONE ENCOUNTER
---------------------  From: Andre/Taylor VERA (Phone Messages Pool ([address]))   To: [employer] Message Pool ([address]);     Sent: 9/30/2021 11:12:29 AM CDT  Subject: General Message     Phone Message    PCP: CHARLES    Time of Call: 1109    Phone Number: [phone]    Returned call at: n/a    Note: Patient calls stating that she thinks she has a sinus infection. She c/o coughing, runny noise, sinus pressure. Patient states that this started on 9/21. She states that she was getting better but now today she is worse.    Please advise---------------------  From: Conchis Anderson ([employer] Message Pool ([address]))   To: Kt Seo MD;     Sent: 9/30/2021 12:20:40 PM CDT  Subject: FW: General MessageNeeds virtual visit.---------------------  From: Kt Seo MD   To: [employer] Message Pool ([address]);     Sent: 9/30/2021 12:56:20 PM CDT  Subject: RE: General MessageCalled pt- she agreed with plan and was transferred to [facility] license of [facility].

## 2022-02-16 NOTE — LETTER
(Inserted Image. Unable to display) June 18, 2019Re: CARLINE SINGH1950Richard Carney M.D.[address] [facility] [place] [phone]: Dr. Salgado following patient has been referred to your office/practice:  CARLINE SINGH  Appointment: June 24, 2019 [place] clinical documentation for a summary of CARLINE's care.  Please do not hesitate to contact our office if any additional questions arise.  All relevant documents and transition of care documents should be mailed or faxed.Your assistance in providing continuity of care is appreciated.Sincerely, [facility] [place] [address] [place] [address](P) [phone](F) [phone]

## 2022-02-16 NOTE — PROGRESS NOTES
Patient:   CARLINE SINGH            MRN: [id number]            FIN: [id number]               Age:   68 years     Sex:  Female     :  1950   Associated Diagnoses:   Peripheral edema; Shortness of breath   Author:   Kt Seo MD      Chief Complaint   3/13/2019 11:17 AM CDT   swelling and pain in right leg; back of calf area      History of Present Illness   Chief complaint and symptoms reviewed with patient and confirmed as above.  patient has been noticing increasing lower extremity swelling over the past week or two, right side more than left  then two days ago started noticing calf discomfort, again worse in the right  varicose veins have been more prominent on the right  has not been as active  mild shortness of breath with activity, no chest pain or dyspnea at rest      Health Status   Allergies:    Allergic Reactions (All)  Severity Not Documented  Ciprofloxacin (No reactions were documented)  Penicillin (No reactions were documented)   Medications:  (Selected)   Prescriptions  Prescribed  Bactrim  mg-160 mg oral tablet: 1 tab(s), PO, BID, # 20 tab(s), 0 Refill(s), Type: Maintenance, Pharmacy: [id number], 1 tab(s) Oral bid,x10 day(s)  IBUPROFEN 600MG TABLETS: 1 tab(s), Oral, q8 hrs, PRN: AS NEEDED FOR PAIN, # 90 tab(s), Type: Soft Stop, Pharmacy: [id number]  Replacement CPAP +12cm H2o: Replacement CPAP +12cm H2o, See Instructions, Instructions: CPAP machine, humidifier, heated tubing, filters, nasal or full face mask with cushion replacment, Supply, # 1 EA, 0 Refill(s), Type: Maintenance  Tessalon Perles 100 mg oral capsule: = 2 cap(s) ( 200 mg ), PO, TID, # 42 cap(s), 0 Refill(s), Type: Maintenance, Pharmacy: [id number], 2 cap(s) Oral tid,x7 day(s)  buPROPion 150 mg/24 hours (XL) oral tablet, extended release: 1 tab(s) ( 150 mg ), po, q 24 hrs, # 90 tab(s), 3 Refill(s), Type: Maintenance, Pharmacy: [id number], 1 tab(s) Oral q 24  hrs  dilTIAZem 240 mg/24 hours oral capsule, extended release: = 1 cap(s) ( 240 mg ), PO, Daily, # 90 cap(s), 3 Refill(s), Type: Maintenance, Pharmacy: [id number], 1 cap(s) Oral daily  furosemide 40 mg oral tablet: = 1 tab(s) ( 40 mg ), PO, Daily, PRN: as needed, # 90 tab(s), 1 Refill(s), Type: Maintenance, [facility], 1 tab(s) Oral daily,PRN:as needed  ibuprofen 600 mg oral tablet: See Instructions, Instructions: TAKE ONE TABLET BY MOUTH EVERY EIGHT HOURS AS NEEDED FOR PAIN, # 90 tab(s), 2 Refill(s), Type: Soft Stop, Pharmacy: [facility] #[id number], TAKE ONE TABLET BY MOUTH EVERY EIGHT HOURS AS NEEDED FOR PAIN  losartan 100 mg oral tablet: 0.5 tab, po, daily, # 90 tab(s), 3 Refill(s), Type: Maintenance, [facility], 0.5 tab Oral daily  metroNIDAZOLE 500 mg oral tablet: = 1 tab(s) ( 500 mg ), PO, q8hr, # 30 tab(s), 0 Refill(s), Type: Maintenance, [facility]: [facility], 1 tab(s) Oral q8 hrs,x10 day(s)  omeprazole 20 mg oral delayed release capsule: = 1 cap(s) ( 20 mg ), po, daily, # 90 cap(s), 3 Refill(s), Type: Maintenance, [facility], 1 cap(s) Oral daily  Documented Medications  Documented  PreserVision: po, bid, 0 Refill(s), Type: Maintenance  Vitamin B Complex 100: 1tab, daily, 0 Refill(s), Type: Maintenance  Vitamin D3 1000 intl units oral capsule: 1 cap(s) ( 1,000 International Unit ), po, daily, 0 Refill(s), Type: Maintenance  aspirin 81 mg oral tablet: 1 tab(s) ( 81 mg ), PO, Daily, # 90 tab(s), 0 Refill(s), Type: Maintenance  mometasone 0.1% topical cream: Topical, daily, 0 Refill(s), Type: Maintenance  turmeric: ( 500 mg ), daily, 0 Refill(s), Type: Maintenance   Problem list:    All Problems (Selected)  Changing skin lesion / SNOMED CT 1583301521 / Confirmed  Hypertension / SNOMED CT 1993545304 / Confirmed  Moderate major depression / SNOMED CT [id number]-[id number] / Confirmed  Morbid  obesity / SNOMED CT 791736720 / Probable  Hypercholesteremia / SNOMED CT 332909791 / Confirmed      Physical Examination   Vital Signs   3/13/2019 11:17 AM CDT Peripheral Pulse Rate 66 bpm    HR Method Manual    Systolic Blood Pressure 126 mmHg    Diastolic Blood Pressure 70 mmHg    Mean Arterial Pressure 89 mmHg    BP Method Manual      Measurements from flowsheet : Measurements   3/13/2019 11:17 AM CDT Height Measured - Standard 61.25 in    Weight Measured - Standard 235.6 lb    BSA 2.15 m2    Body Mass Index 44.15 kg/m2  HI      General:  Alert and oriented, No acute distress.    Cardiovascular:  swelling in ankles and calves, right 2+, left 1+, calf tenderness with palpation, Yaquelin's sign negative.    Integumentary:  Warm, Dry, Pink.       Impression and Plan      Diagnosis   Peripheral edema (EVC38-BZ R60.9)   Shortness of breath (NAU38-EB R06.02)   Course:  will refer for U/S today, sent order to [place] at patient's request, labs today. Will call patient with results.    Orders     Orders (Selected)   Outpatient Orders  Ordered  US Lower Extremity Venous Doppler (Request): Priority: STAT, Instructions: bilateral for bilateral calf pain and swelling, right worse than left, Peripheral edema  Shortness of breath  Completed  B-type Natriuretic Peptide (Request): Priority: Urgent, Peripheral edema  Shortness of breath  Basic Metabolic Panel (Request): Priority: Urgent, Peripheral edema  Shortness of breath  CBC w/o Diff (Request): Priority: Urgent, Peripheral edema  Shortness of breath  Hepatic Function Panel (Request): Priority: Urgent, Peripheral edema  Shortness of breath.

## 2022-02-16 NOTE — NURSING NOTE
Phone Message    PCP:  CHARLES      Time of Call:  1:30 pm    Phone number:  [phone]    Returned call at: 2:00 pm    Note:   Pt called stating that she went to [facility] on Saturday evening for what she thought was a bowel obstruction. She was treated for a UTI and they suggested to have her f/u with a urinalysis later this week after completing the antibiotics. Pt wanted to know how to have her records transferred. Pt watches her grandchildren during the week in [place], [place] and does not come back to [place] until the weekend. Not able to come into the clinic. Suggested that she be seen at urgent care on Saturday or Sunday in . Pt expressed worry that it could still be a bowel obstruction or diverticulitis. Advised that if she was seen in clinic these could all be looked at and ruled out. Expressed understanding and would f/u in clinic during the weekend.     Pharmacy: n/a    Last office visit and reason: 1/15/18    Transferred to: n/a

## 2022-02-16 NOTE — TELEPHONE ENCOUNTER
---------------------  From: Shameka Perkins MA (Phone Messages Pool ([id number]_[facility]))   To: Kt Seo MD;     Sent: 3/11/2019 4:51:57 PM CDT  Subject: Phone Note: Swelling     PCP:   CHARLES     Time of Call:  445       Person Calling:  Dianna  Phone number:      Returned call at: _    Note:   Concerned about swelling in right like.    Pharmacy: _    Last office visit and reason:  19    Transferred to: [facility] patient at 505pm. Bilateral leg swelling has been worsening over the past few days, right worse than left. No calf pain., Has been taking furosemide for past 3 days. Had been using  furosemide. Now has new pills, will try tomorrow. If not improving, will schedule appointment to be seen.Returned Call  Time: 10:50 am  Note:  Pt called back today stating that she is concerned about her leg swelling and would like to come in today. She has been taking the water pills and yesterday sat with her foot elevated and had a lot of pain. Thinking that she may need a scan. I advised to come right away and we would get her into see [facility]. Pt agreed & apt made.

## 2022-02-16 NOTE — TELEPHONE ENCOUNTER
---------------------  From: Shameka Perkins MA (Phone Messages Pool ([id number]_[place] [facility] [place]))   To: Kt Seo MD;     Sent: 7/26/2019 4:00:19 PM CDT  Subject: Phone Note: Cream     PCP:   CHARLES     Time of Call:  3:30Pm       Person Calling:  Dianna  Phone number:      Returned call at: _    Note:   Patient called stating she has been having painful intercourse. She would like to discuss using a vaginal cream, she did she Premarin in a magazine is wondering about that. Does have some concerns with side effects and things of that matter. States she thinks she has talked about this with you before. She would like a call back Tuesday 7/30 after 10am.    [facility]acy: [facility]    Last office visit and reason: 4/25/19     Transferred to: [facility] patient at 150pm.   Looking into options for cost savings. Will let me know if she wants to consider and will call me back.

## 2022-02-16 NOTE — NURSING NOTE
Phone Message    PCP:    CHARLES     Time of Call:  4:55       Person Calling:  Dianna  Phone number:  [phone]    Reason for call:  Patient having urinary frequency with foul smelling urine.  Also c/o pedal edema.  She took a diuretic this AM so that may explain the frequency as she does not have dysuria.  I advised visit with CHARLES.  Transferred to schedule.

## 2022-02-16 NOTE — PROGRESS NOTES
Patient:   CARLINE SINHG            MRN: [id number]            FIN: [id number]               Age:   68 years     Sex:  Female     :  1950   Associated Diagnoses:   Diverticulitis   Author:   Kt Seo MD      Chief Complaint   10/16/2018 2:49 PM CDT   c/o abdominal pain since Friday, no vomiting, diarrhea or fever      History of Present Illness   patient here with recurrence of lower abdominal pain, similar to prior episode of mild diverticulitis  reviewed prior colonoscopy from , showed diverticulosis  no fevers, no vomiting, stools have been normal  pain is achy, cramping, suprapubic  no dysuria symptoms      Health Status   Allergies:    Allergic Reactions (All)  Severity Not Documented  Ciprofloxacin (No reactions were documented)  Penicillin (No reactions were documented)   Medications:  (Selected)   Prescriptions  Prescribed  Replacement CPAP +12cm H2o: Replacement CPAP +12cm H2o, See Instructions, Instructions: CPAP machine, humidifier, heated tubing, filters, nasal or full face mask with cushion replacment, Supply, # 1 EA, 0 Refill(s), Type: Maintenance  buPROPion 150 mg/24 hours (XL) oral tablet, extended release: 1 tab(s) ( 150 mg ), po, q 24 hrs, # 90 tab(s), 3 Refill(s), Type: Maintenance, Pharmacy: [facility] #[id number], 1 tab(s) Oral q 24 hrs  dilTIAZem 240 mg/24 hours oral capsule, extended release: = 1 cap(s) ( 240 mg ), PO, Daily, # 90 cap(s), 3 Refill(s), Type: Maintenance, [facility]acy: [id number], 1 cap(s) Oral daily  furosemide 40 mg oral tablet: 1 tab(s) ( 40 mg ), PO, Daily, PRN: as needed, # 90 tab(s), 1 Refill(s), Type: Maintenance, Pharmacy: [id number], 1 tab(s) po daily,PRN:as needed  ibuprofen 600 mg oral tablet: See Instructions, Instructions: TAKE ONE TABLET BY MOUTH EVERY EIGHT HOURS AS NEEDED FOR PAIN, # 90 tab(s), 2 Refill(s), Type: Soft Stop, Pharmacy: [facility] #[id number], TAKE ONE TABLET BY MOUTH EVERY EIGHT HOURS AS NEEDED FOR  PAIN  losartan 100 mg oral tablet: 0.5 tab, po, daily, # 90 tab(s), 3 Refill(s), Type: Maintenance, Pharmacy: [facility] #[id number]  omeprazole 20 mg oral delayed release capsule: = 1 cap(s) ( 20 mg ), po, daily, # 90 cap(s), 3 Refill(s), Type: Maintenance, Pharmacy: [facility] #[id number], 1 cap(s) Oral daily  Documented Medications  Documented  PreserVision: po, bid, 0 Refill(s), Type: Maintenance  Vitamin B Complex 100: 1tab, daily, 0 Refill(s), Type: Maintenance  Vitamin D3 1000 intl units oral capsule: 1 cap(s) ( 1,000 International Unit ), po, daily, 0 Refill(s), Type: Maintenance  aspirin 81 mg oral tablet: 1 tab(s) ( 81 mg ), PO, Daily, # 90 tab(s), 0 Refill(s), Type: Maintenance  turmeric: ( 500 mg ), daily, 0 Refill(s), Type: Maintenance   Problem list:    All Problems (Selected)  Changing skin lesion / SNOMED CT 9614624056 / Confirmed  Hypertension / SNOMED CT 0767676492 / Confirmed  Moderate major depression / SNOMED CT [id number] / Confirmed  Morbid obesity / SNOMED CT 955767045 / Probable  Hypercholesteremia / SNOMED CT 831322115 / Confirmed      Histories   Past Medical History:    Active  Hypertension (SNOMED CT 1595528022)  Hypercholesteremia (SNOMED CT 648400253)  Morbid obesity (SNOMED CT 830337352)  Moderate major depression (SNOMED CT V3E9229S-296E-5JQ0-WW20-6VDY436UB740)  Changing skin lesion (SNOMED CT 1013688204)  Resolved  Sleep apnea (SNOMED CT 328770789):  Resolved on 3/8/2017 at 66 years.  Pregnancy (SNOMED CT 281153221):  Resolved on 11/26/1983 at 33 years.   Family History:    MI  Mother  Father  Melanoma  Mother  Sister  Diabetes mellitus  Brother (Eliud)  CA - Cancer of colon  Uncle (M)  CAD - Coronary artery disease  Mother  Father     Procedure history:    Polysomnogram-Titration Study on 3/31/2015 at 64 Years.  Comments:  4/8/2015 7:19 PM - Lesli Iyer CMA  CPAP titrated to +11cm H2O which yielded AHI of 2.3/hr and eliminated snoring  Colonoscopy  (703223394) on 2013 at 62 Years.  Comments:  2015 8:51 AM - Isatu Rodriguez  Repeat in 10 years.  Hx of removal of cyst (V15.29) on 2011 at 60 Years.  Colonoscopy (225861387) on 2008 at 58 Years.  Colonoscopy and biopsy of colon (0398840554) on 2004 at 54 Years.  Total left knee arthroplasty in the month of 2004 at 53 Years.  Excision of corneal lesion (976986775) on 10/4/1999 at 49 Years.  Comments:  2015 8:55 AM - Isatu Rodriguez  Left.  Left knee debridement in  at 47 Years.  Incidental appendectomy (762429757) in  at 42 Years.  D&C - Dilatation and curettage (1973653123) in  at 35 Years.  Hysterectomy (403334115) in  at 33 Years.   section (61658508) in  at 33 Years.  Tonsillectomy (932171106).      Physical Examination   Vital Signs   10/16/2018 2:49 PM CDT Temperature Tympanic 97.3 DegF  LOW    Peripheral Pulse Rate 68 bpm    Pulse Site Radial artery    HR Method Manual    Systolic Blood Pressure 142 mmHg  HI    Diastolic Blood Pressure 86 mmHg  HI    Mean Arterial Pressure 105 mmHg    BP Site Right arm    BP Method Manual      Measurements from flowsheet : Measurements   10/16/2018 2:49 PM CDT   Weight Measured - Standard                232.8 lb     General:  Alert and oriented, No acute distress.    HENT:  Oral mucosa is moist.    Respiratory:  Lungs are clear to auscultation.    Cardiovascular:  Normal rate, Regular rhythm.    Gastrointestinal:  Soft, Non-distended, Normal bowel sounds, suprapubic tenderness, no rebound or guarding.       Review / Management   Results review:  Lab results   10/3/2018 10:42 AM CDT Sodium Level 141 mmol/L    Potassium Level 4.9 mmol/L    Chloride Level 104 mmol/L    CO2 Level 30 mmol/L    Glucose Level 95 mg/dL    BUN 20 mg/dL    Creatinine Level 0.65 mg/dL    BUN/Creat Ratio NOT APPLICABLE    eGFR 91 mL/min/1.73m2    eGFR African American 106 mL/min/1.73m2    Calcium Level 9.8 mg/dL    Cholesterol 246 mg/dL  HI     Non-HDL Cholesterol 189  HI    HDL 57 mg/dL    Cholesterol/HDL Ratio 4.3      HI    Triglyceride 158 mg/dL  HI    WBC 6.6    RBC 4.59    Hgb 11.3 gm/dL  LOW    Hct 37.1 %    MCV 80.8 fL    MCH 24.6 pg  LOW    MCHC 30.5 gm/dL  LOW    RDW 15.3 %  HI    Platelet 279    MPV 9.9 fL   .       Impression and Plan   Diagnosis     Diverticulitis (IYJ85-PM K57.92).     Course:  Progressing as expected.    Patient Instructions:       Counseled: Patient, Regarding diagnosis, Regarding treatment, discussed option of referral to speciality, patient will monitor and follow up if not improving.    Orders     Orders (Selected)   Prescriptions  Prescribed  Bactrim  mg-160 mg oral tablet: 1 tab(s), PO, BID, # 20 tab(s), 0 Refill(s), Type: Maintenance, Pharmacy: [id number], 1 tab(s) Oral bid,x10 day(s)  metroNIDAZOLE 500 mg oral tablet: = 1 tab(s) ( 500 mg ), PO, q8hr, # 30 tab(s), 0 Refill(s), Type: Maintenance, Pharmacy: [facility] #[id number], 1 tab(s) Oral q8 hrs,x10 day(s).

## 2022-02-16 NOTE — TELEPHONE ENCOUNTER
---------------------  From: Taylor Whatley (Phone Messages Pool ([id number]_[facility]))   Sent: 4/26/2021 12:11:28 PM CDT  Subject: General Message     Phone Message    PCP: CHARLES    Time of Call: 1151    Phone Number: [phone]    Returned call at: 1210    Note: Patient calls stating that she has a cough and a runny nose. Patient states that she has no other sx and is not going to get a covid test because she does not believe in it.    Called pt back and advised a telephone visit. Patient agreed and was transferred to scheduling.   Called patient, left VM

## 2022-02-16 NOTE — PROGRESS NOTES
Patient:   CARLINE SINGH            MRN: [id number]            FIN: [id number]               Age:   67 years     Sex:  Female     :  1950   Associated Diagnoses:   Acute maxillary sinusitis   Author:   Kt Seo MD      Chief Complaint   9/10/2018 11:41 AM CDT   cough; feels that it has moved to chest; runny nose      History of Present Illness   patient with persistent cough and drainage  seen last week, started on zithromax, noted a little loose stools  no high fevers, still sinus pressure  allergic to pcn and cipro         Health Status   Allergies:    Allergic Reactions (All)  Severity Not Documented  Ciprofloxacin (No reactions were documented)  Penicillin (No reactions were documented)   Medications:  (Selected)   Prescriptions  Prescribed  Replacement CPAP +12cm H2o: Replacement CPAP +12cm H2o, See Instructions, Instructions: CPAP machine, humidifier, heated tubing, filters, nasal or full face mask with cushion replacment, Supply, # 1 EA, 0 Refill(s), Type: Maintenance  buPROPion 150 mg/24 hours (XL) oral tablet, extended release: 1 tab(s) ( 150 mg ), po, q 24 hrs, # 90 tab(s), 3 Refill(s), Type: Maintenance, Pharmacy: [facility] #[id number], 1 tab(s) po q 24 hrs  dilTIAZem 240 mg/24 hours oral capsule, extended release: 1 cap(s) ( 240 mg ), PO, Daily, # 90 cap(s), 3 Refill(s), Type: Maintenance, Pharmacy: [facility] #[id number], 1 cap(s) po daily  furosemide 40 mg oral tablet: 1 tab(s) ( 40 mg ), PO, Daily, PRN: as needed, # 90 tab(s), 1 Refill(s), Type: Maintenance, [id number], 1 tab(s) po daily,PRN:as needed  ibuprofen 600 mg oral tablet: See Instructions, Instructions: TAKE ONE TABLET BY MOUTH EVERY EIGHT HOURS AS NEEDED FOR PAIN, # 90 tab(s), 2 Refill(s), Type: Soft Stop, Pharmacy: [facility] #[facility]10, TAKE ONE TABLET BY MOUTH EVERY EIGHT HOURS AS NEEDED FOR PAIN  losartan 100 mg oral tablet: 1 tab(s) ( 100 mg ), po, daily, # 90 tab(s), 3 Refill(s), Type:  Maintenance, Pharmacy: [facility] #[id number], 1 tab(s) po daily  omeprazole 20 mg oral delayed release capsule: = 1 cap(s) ( 20 mg ), po, daily, # 90 cap(s), 3 Refill(s), Type: Maintenance, Pharmacy: [facility] #[id number], 1 cap(s) Oral daily  Documented Medications  Documented  PreserVision: po, bid, 0 Refill(s), Type: Maintenance  Vitamin B Complex 100: 1tab, daily, 0 Refill(s), Type: Maintenance  Vitamin D3 1000 intl units oral capsule: 1 cap(s) ( 1,000 International Unit ), po, daily, 0 Refill(s), Type: Maintenance  aspirin 81 mg oral tablet: 1 tab(s) ( 81 mg ), PO, Daily, # 90 tab(s), 0 Refill(s), Type: Maintenance  turmeric: ( 500 mg ), daily, 0 Refill(s), Type: Maintenance   Problem list:    All Problems (Selected)  Changing skin lesion / SNOMED CT 1614199120 / Confirmed  Hypertension / SNOMED CT 9394316630 / Confirmed  Moderate major depression / SNOMED CT T8T1072H-[id number] / Confirmed  Morbid obesity / SNOMED CT 787321331 / Probable  Hypercholesteremia / SNOMED CT 361577962 / Confirmed      Histories   Past Medical History:    Active  Hypertension (SNOMED CT 9982381207)  Hypercholesteremia (SNOMED CT 815651434)  Morbid obesity (SNOMED CT 786460553)  Moderate major depression (SNOMED CT D4M4921Q-339R-3GP8-[id number])  Resolved  Sleep apnea (SNOMED CT 138701164):  Resolved on 3/8/2017 at 66 years.  Pregnancy (SNOMED CT 115986252):  Resolved on 11/26/1983 at 33 years.   Family History:    MI  Mother  Father  Melanoma  Mother  Sister  Diabetes mellitus  Brother (Eliud)  CA - Cancer of colon  Uncle (M)  CAD - Coronary artery disease  Mother  Father     Procedure history:    Polysomnogram-Titration Study on 3/31/2015 at 64 Years.  Comments:  4/8/2015 7:19 PM - Lesli Iyer CMA  CPAP titrated to +11cm H2O which yielded AHI of 2.3/hr and eliminated snoring  Colonoscopy ([id number]) on 6/6/2013 at 62 Years.  Comments:  2/24/2015 8:51 AM - Isatu Rodriguez  Repeat in 10 years.  Hx of removal  of cyst (V15.29) on 2011 at 60 Years.  Colonoscopy (426181635) on 2008 at 58 Years.  Colonoscopy and biopsy of colon (9042202136) on 2004 at 54 Years.  Total left knee arthroplasty in the month of 2004 at 53 Years.  Excision of corneal lesion ([id number]) on 10/4/1999 at 49 Years.  Comments:  2015 8:55 AM - Isatu Rodriguez  Left.  Left knee debridement in  at 47 Years.  Incidental appendectomy ([id number]) in  at 42 Years.  D&C - Dilatation and curettage (6561390179) in  at 35 Years.  Hysterectomy ([id number]) in  at 33 Years.   section ([id number]) in  at 33 Years.  Tonsillectomy (921551432).   Social History:        Alcohol Assessment            Past                     Comments:                      2017 - Hanna Francis LPN                     Denies alcohol use.      Tobacco Assessment: Denies Tobacco Use            Never      Substance Abuse Assessment: Denies Substance Abuse            Never      Employment and Education Assessment            Part time      Home and Environment Assessment            Marital status:  (Living together).  Spouse/Partner name: Marvel.  Lives with Spouse.  4 children.               Living situation: Home/Independent.      Nutrition and Health Assessment            Type of diet: Regular.      Exercise and Physical Activity Assessment: Regular exercise            Exercise frequency: 3-4 times/week.  Exercise type: Water aerobics.      Sexual Assessment            Sexually active: Yes.  Sexual orientation: Heterosexual.  Other contraceptive use: Hysterectomy.      Other Assessment            First menses age 13.  Regular menses.                     Comments:                      2015 - Isatu Rodriguez                     Wears glasses.        Physical Examination   Vital Signs   9/10/2018 11:41 AM CDT Temperature Tympanic 98.0 DegF    Peripheral Pulse Rate 88 bpm    Pulse Site Radial artery    HR Method Electronic     Systolic Blood Pressure 110 mmHg    Diastolic Blood Pressure 70 mmHg    Mean Arterial Pressure 83 mmHg    BP Site Left arm    BP Method Manual    Oxygen Saturation 95 %      Measurements from flowsheet : Measurements   9/10/2018 11:41 AM CDT Height Measured - Standard 61.5 in    Weight Measured - Standard 230.0 lb    BSA 2.13 m2    Body Mass Index 42.75 kg/m2  HI      General:  Alert and oriented, No acute distress.    Eye:  Pupils are equal, round and reactive to light, Normal conjunctiva.    HENT:  Normocephalic, Tympanic membranes are clear, Oral mucosa is moist, No pharyngeal erythema.         Sinus: Bilateral, Maxillary sinus, Tenderness.    Neck:  Supple, Non-tender.    Respiratory:  Lungs are clear to auscultation.    Cardiovascular:  Normal rate, Regular rhythm.       Impression and Plan   Diagnosis     Acute maxillary sinusitis (EAK83-BD J01.00).     Course:  Not improving.    Orders     Orders (Selected)   Prescriptions  Prescribed  cefdinir 300 mg oral capsule: = 1 cap(s) ( 300 mg ), PO, q12hr, # 20 cap(s), 0 Refill(s), Type: Maintenance, Pharmacy: [facility] #[id number], 1 cap(s) Oral q12 hrs,x10 day(s).

## 2022-02-16 NOTE — PROGRESS NOTES
Patient:   CARLINE SINGH            MRN: [id number]            FIN: [id number]               Age:   68 years     Sex:  Female     :  1950   Associated Diagnoses:   Medicare annual wellness visit, subsequent; Seborrheic dermatitis of scalp; Fatigue; Hypercholesteremia; Hypertension; Postmenopausal   Author:   Kt Seo MD      Visit Information      Primary Care Provider (PCP):  Kt Seo MD    NPI# [id number]      Chief Complaint   10/3/2018 9:01 AM CDT    AWV; mulitple concerns; fasting for labs   Here for annual wellness physical for Medicare      History of Present Illness     Current medical providers reviewed with patient updated on demographics in chart as listed on the patient health history form.  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 Painful but not interested in intervention at this time. Has braces at home that she will try wearing.  Also ongoing issues with seborrheic dermatitis of the scalp. Needs to establish with new dermatologist closer to home. Requested assistance from referral office.  Depression screening completed and history reviewed with patient, no concerns.  CAGE reviewed with patient, no concerns.  Functional ability/Health Risk assessment reviewed:   Hearing impairment - denies concerns   Activities of daily living - independent without concerns   Fall risks - denies falls, no assistance required with walking or transfer   Home safety issues reviewed, no concerns raised.  Cognitive impairment evaluated, patient oriented to year, date, location, self.  No deficits notes.  Cognitive screening and dementia symptoms reviewed.  Advanced care planning discussed.  Has not completed. Will send home with paperwork. Patient is welcome to provide paperwork to us so that we can copy into electronic medical record.  Preventive services reviewed and documented on preventive services checklist. Due for mammogram, DEXA. Fasting for labwork.  Current  medications working well.         Review of Systems   ROS reviewed as documented in chart      Health Status   Allergies:    Allergic Reactions (Selected)  Severity Not Documented  Ciprofloxacin (No reactions were documented)  Penicillin (No reactions were documented)   Medications:  (Selected)   Prescriptions  Prescribed  Replacement CPAP +12cm H2o: Replacement CPAP +12cm H2o, See Instructions, Instructions: CPAP machine, humidifier, heated tubing, filters, nasal or full face mask with cushion replacment, Supply, # 1 EA, 0 Refill(s), Type: Maintenance  buPROPion 150 mg/24 hours (XL) oral tablet, extended release: 1 tab(s) ( 150 mg ), po, q 24 hrs, # 90 tab(s), 3 Refill(s), Type: Maintenance, Pharmacy: [id number], 1 tab(s) po q 24 hrs  dilTIAZem 240 mg/24 hours oral capsule, extended release: 1 cap(s) ( 240 mg ), PO, Daily, # 90 cap(s), 3 Refill(s), Type: Maintenance, Pharmacy: [facility] #[id number], 1 cap(s) po daily  furosemide 40 mg oral tablet: 1 tab(s) ( 40 mg ), PO, Daily, PRN: as needed, # 90 tab(s), 1 Refill(s), Type: Maintenance, Pharmacy: [id number], 1 tab(s) po daily,PRN:as needed  ibuprofen 600 mg oral tablet: See Instructions, Instructions: TAKE ONE TABLET BY MOUTH EVERY EIGHT HOURS AS NEEDED FOR PAIN, # 90 tab(s), 2 Refill(s), Type: Soft Stop, Pharmacy: [facility] #[id number], TAKE ONE TABLET BY MOUTH EVERY EIGHT HOURS AS NEEDED FOR PAIN  losartan 100 mg oral tablet: 1 tab(s) ( 100 mg ), po, daily, # 90 tab(s), 3 Refill(s), Type: Maintenance, Pharmacy: [facility] #[id number], 1 tab(s) po daily  omeprazole 20 mg oral delayed release capsule: = 1 cap(s) ( 20 mg ), po, daily, # 90 cap(s), 3 Refill(s), Type: Maintenance, Pharmacy: [facility] #[id number], 1 cap(s) Oral daily  Documented Medications  Documented  PreserVision: po, bid, 0 Refill(s), Type: Maintenance  Vitamin B Complex 100: 1tab, daily, 0 Refill(s), Type: Maintenance  Vitamin D3 1000 intl units oral capsule: 1 cap(s) (  1,000 International Unit ), po, daily, 0 Refill(s), Type: Maintenance  aspirin 81 mg oral tablet: 1 tab(s) ( 81 mg ), PO, Daily, # 90 tab(s), 0 Refill(s), Type: Maintenance  turmeric: ( 500 mg ), daily, 0 Refill(s), Type: Maintenance   Problem list:    All Problems  Changing skin lesion / SNOMED CT 6236847767 / Confirmed  Hypertension / SNOMED CT 0901955442 / Confirmed  Moderate major depression / SNOMED CT [id number] / Confirmed  Morbid obesity / SNOMED CT 591350321 / Probable  Hypercholesteremia / SNOMED CT 823405190 / Confirmed  Resolved: Sleep apnea / SNOMED CT 817042972  Resolved: Pregnancy / SNOMED CT 143197816      Histories   Past Medical History:    Active  Hypertension (SNOMED CT 1361258516)  Hypercholesteremia (SNOMED CT 931546756)  Morbid obesity (SNOMED CT 522718757)  Moderate major depression (SNOMED CT [id number])  Resolved  Sleep apnea (SNOMED CT 874151246):  Resolved on 3/8/2017 at 66 years.  Pregnancy (SNOMED CT 715100466):  Resolved on 11/26/1983 at 33 years.   Family History:    MI  Mother  Father  Melanoma  Mother  Sister  Diabetes mellitus  Brother (Eliud)  CA - Cancer of colon  Uncle (M)  CAD - Coronary artery disease  Mother  Father     Procedure history:    Polysomnogram-Titration Study on 3/31/2015 at 64 Years.  Comments:  4/8/2015 7:19 PM - Lesli Iyer CMA  CPAP titrated to +11cm H2O which yielded AHI of 2.3/hr and eliminated snoring  Colonoscopy ([id number]) on 6/6/2013 at 62 Years.  Comments:  2/24/2015 8:51 AM - Isatu Rodriguez  Repeat in 10 years.  Hx of removal of cyst (V15.29) on 6/27/2011 at 60 Years.  Colonoscopy ([id number]) on 12/29/2008 at 58 Years.  Colonoscopy and biopsy of colon ([id number]) on 12/20/2004 at 54 Years.  Total left knee arthroplasty in the month of 6/2004 at 53 Years.  Excision of corneal lesion ([id number]) on 10/4/1999 at 49 Years.  Comments:  2/24/2015 8:55 MARK - Isatu Rodriguez.  Left knee debridement in   at 47 Years.  Incidental appendectomy ([id number]) in  at 42 Years.  D&C - Dilatation and curettage (2924022431) in  at 35 Years.  Hysterectomy ([id number]) in  at 33 Years.   section ([id number]) in  at 33 Years.  Tonsillectomy ([id number]).   Social History:        Alcohol Assessment            Past                     Comments:                      2017 - Penny ZAVALAHanna                     Denies alcohol use.      Tobacco Assessment: Denies Tobacco Use            Never      Substance Abuse Assessment: Denies Substance Abuse            Never      Employment and Education Assessment            Part time      Home and Environment Assessment            Marital status:  (Living together).  Spouse/Partner name: Marvel.  Lives with Spouse.  4 children.               Living situation: Home/Independent.      Nutrition and Health Assessment            Type of diet: Regular.      Exercise and Physical Activity Assessment: Regular exercise            Exercise frequency: 3-4 times/week.  Exercise type: Water aerobics.      Sexual Assessment            Sexually active: Yes.  Sexual orientation: Heterosexual.  Other contraceptive use: Hysterectomy.      Other Assessment            First menses age 13.  Regular menses.                     Comments:                      2015 - Isatu Rodriguez                     Wears glasses.        Physical Examination   Vital Signs   10/3/2018 9:01 AM CDT Temperature Tympanic 97.6 DegF  LOW    Peripheral Pulse Rate 74 bpm    Pulse Site Radial artery    HR Method Manual    Systolic Blood Pressure 126 mmHg    Diastolic Blood Pressure 80 mmHg    Mean Arterial Pressure 95 mmHg    BP Site Left arm    BP Method Manual      Measurements from flowsheet : Measurements   10/3/2018 9:01 AM CDT Height Measured - Standard 61.25 in    Weight Measured - Standard 230.2 lb    BSA 2.12 m2    Body Mass Index 43.14 kg/m2  HI      General:  Alert and oriented, No acute  distress.    Eye:  Pupils are equal, round and reactive to light, Extraocular movements are intact, Normal conjunctiva.    HENT:  Normocephalic, Tympanic membranes are clear, Oral mucosa is moist, No pharyngeal erythema.    Neck:  Supple, Non-tender, No lymphadenopathy, No thyromegaly.    Respiratory:  Lungs are clear to auscultation.    Cardiovascular:  Normal rate, Regular rhythm.    Breast:  No mass, No tenderness, No discharge.    Gastrointestinal:  Soft, Non-tender, Non-distended, No organomegaly.    Musculoskeletal:  Normal range of motion, Normal strength.    Integumentary:  Warm, Dry, Pink, No rash, no concerning lesions, seborrheic dermatitis on posterior scalp, seborrheic keratoses on back.    Neurologic:  Alert, Oriented, Normal sensory.    Psychiatric:  Cooperative, Appropriate mood & affect.       Impression and Plan   Diagnosis     Medicare annual wellness visit, subsequent (BCC50-LJ Z00.00).     Course:  Progressing as expected.    Patient Instructions:       Counseled: Patient, Regarding diagnosis, Regarding treatment, Regarding medications, Diet, Activity, Prognosis/ end-of-life issues, BMI, exercise, healthy eating, home safety, depression.    Summary:  Preventive services recommended as noted on preventive services checklist..    Diagnosis     Seborrheic dermatitis of scalp (LVY23-UG L21.9).     Fatigue (UHN83-XA R53.83).     Hypercholesteremia (VKO05-OI E78.00).     Hypertension (JDR58-SY I10).     Postmenopausal (DGK00-UN Z78.0).     Course:  at patient's request, will try lower dose of losartan. Labs today.    Orders     Orders (Selected)   Outpatient Orders  Ordered  DEXA Scan (Request): Postmenopausal  Referral (Request): 10/03/18 9:22:00 CDT, Referred to: Dermatology, Seborrheic dermatitis of scalp  Ordered (In Transit)  Basic Metabolic Panel* ([facility]): Specimen Type: Serum, Collection Date: 10/03/18 9:32:00 CDT  CBC (h/h, RBC, indices, WBC, Plt)* ([facility]): Specimen Type: Blood, Collection  Date: 10/03/18 9:32:00 CDT  Lipid panel with reflex to direct ldl* ([facility]): Specimen Type: Serum, Collection Date: 10/03/18 9:32:00 CDT  Prescriptions  Prescribed  buPROPion 150 mg/24 hours (XL) oral tablet, extended release: 1 tab(s) ( 150 mg ), po, q 24 hrs, # 90 tab(s), 3 Refill(s), Type: Maintenance, Pharmacy: [id number], 1 tab(s) Oral q 24 hrs  dilTIAZem 240 mg/24 hours oral capsule, extended release: = 1 cap(s) ( 240 mg ), PO, Daily, # 90 cap(s), 3 Refill(s), Type: Maintenance, Pharmacy: [facility] #[id number], 1 cap(s) Oral daily  losartan 100 mg oral tablet: 0.5 tab, po, daily, # 90 tab(s), 3 Refill(s), Type: Maintenance, Pharmacy: [facility] #[id number].

## 2022-02-16 NOTE — PROGRESS NOTES
Patient:   CARLINE SINGH            MRN: [id number]            FIN: [id number]               Age:   66 years     Sex:  Female     :  1950   Associated Diagnoses:   Inflamed seborrheic keratosis   Author:   Kt Seo MD      Chief Complaint   2017 11:23 AM CST    Pt. has changing lesion @ R lower abd. Was irritated, painful. Noticed since last visit.        History of Present Illness   Chief complaint and symptoms reviewed with patient and confirmed as above except lesion is on right upper thigh. Some irregular pigmentations. Regualr borders. Not bleeding. Some discomfort.      Health Status   Allergies:    Allergic Reactions (All)  Severity Not Documented  Ciprofloxacin (No reactions were documented)  Penicillin (No reactions were documented)   Medications:  (Selected)   Prescriptions  Prescribed  Replacement CPAP +12cm H2o: Replacement CPAP +12cm H2o, See Instructions, Instructions: CPAP machine, humidifier, heated tubing, filters, nasal or full face mask with cushion replacment, Supply, # 1 EA, 0 Refill(s), Type: Maintenance  buPROPion 300 mg/24 hours (XL) oral tablet, extended release: 1 tab(s) ( 300 mg ), po, daily, # 90 tab(s), 3 Refill(s), Type: Maintenance, Pharmacy: [facility] #[id number], 1 tab(s) po daily  diltiaZEM 240 mg/24 hours oral capsule, extended release: 1 cap(s) ( 240 mg ), PO, Daily, # 90 cap(s), 3 Refill(s), Type: Maintenance, Pharmacy: [facility] #[id number], 1 cap(s) po daily  furosemide 40 mg oral tablet: 1 tab(s) ( 40 mg ), PO, Daily, PRN: as needed, # 90 tab(s), 1 Refill(s), Type: Maintenance, Pharmacy: [id number], 1 tab(s) po daily,PRN:as needed  ibuprofen 600 mg oral tablet: See Instructions, Instructions: TAKE ONE TABLET BY MOUTH EVERY EIGHT HOURS, # 90 tab(s), 1 Refill(s), Type: Soft Stop, Pharmacy: [facility] #[id number]  losartan 100 mg oral tablet: 1 tab(s) ( 100 mg ), PO, Daily, # 90 tab(s), 3 Refill(s), Type: Maintenance, Pharmacy: [id number], 1 tab(s) po daily  omeprazole 20 mg oral delayed release tablet: 1 tab(s) ( 20 mg ), po, daily, # 90 tab(s), 3 Refill(s), Type: Maintenance, Pharmacy: [facility] #[id number], 1 tab(s) po daily  Documented Medications  Documented  Josefina-C 500 mg oral tablet: ( 500 mg ), po, daily, 0 Refill(s), Type: Maintenance  Fiber Complex: Fiber Complex, daily, Supply, 0 Refill(s), Type: Maintenance  Fish Oil 1200 mg oral capsule: 1 cap(s) ( 1,200 mg ), po, daily, 0 Refill(s), Type: Maintenance  Hair, Skin & Nails: ( 5 mg ), po, daily, 0 Refill(s), Type: Maintenance  Hi-Potency Quer cetin: Hi-Potency Quer cetin, daily, Supply, 0 Refill(s), Type: Maintenance  Hormone Essentials: Hormone Essentials, daily, Supply, 0 Refill(s), Type: Maintenance  Kelp,B10,Cider vinegar,Lecithin: Kelp,B10,Cider vinegar,Lecithin, daily, Supply, 0 Refill(s), Type: Maintenance  Lemon Peel-full spectrum: Lemon Peel-full spectrum, daily, Supply, 0 Refill(s), Type: Maintenance  PreserVision: po, bid, 0 Refill(s), Type: Maintenance  Vagifem 10 mcg vaginal tablet: 1 EA ( 10 mcg ), vag, 2x/wk, 0 Refill(s), Type: Maintenance  Vinpocetine-memory support: Vinpocetine-memory support, ( 30 mg ), daily, Supply, 0 Refill(s), Type: Maintenance  Vitamin B Complex 100: 1tab, daily, 0 Refill(s), Type: Maintenance  Vitamin C 500 mg oral capsule: 1 cap(s) ( 500 mg ), PO, Daily, # 30 cap(s), 0 Refill(s), Type: Maintenance  Vitamin D3 1000 intl units oral capsule: 1 cap(s) ( 1,000 International Unit ), po, daily, 0 Refill(s), Type: Maintenance  aspirin 81 mg oral tablet: 1 tab(s) ( 81 mg ), PO, Daily, # 90 tab(s), 0 Refill(s), Type: Maintenance  lutein 20 mg oral tablet: 1 tab(s) ( 20 mg ), po, daily, 0 Refill(s), Type: Maintenance  lysine 500 mg oral tablet: 2 tab(s) ( 1,000 mg ), po, daily, 0 Refill(s), Type: Maintenance  turmeric: ( 500 mg ), daily, 0 Refill(s), Type: Maintenance   Problem list:    All Problems (Selected)  Hypertension / SNOMED CT 5731160089 /  Confirmed  Moderate major depression / SNOMED CT [id number] / Confirmed  Morbid obesity / SNOMED CT 784612704 / Probable  Sleep apnea / SNOMED CT 283098150 / Confirmed  Hypercholesteremia / SNOMED CT 638333673 / Confirmed      Histories   Past Medical History:    Active  Hypertension (SNOMED CT 1805598141)  Sleep apnea (SNOMED CT 468640027)  Hypercholesteremia (SNOMED CT 798429769)  Morbid obesity (SNOMED CT 297227247)  Moderate major depression (SNOMED CT [id number]-[id number])  Resolved  Pregnancy (SNOMED CT 838460369):  Resolved on 1983 at 33 years.   Family History:    MI  Mother  Father  Melanoma  Mother  Sister  Diabetes mellitus  Brother (Eliud)  CA - Cancer of colon  Uncle (M)  CAD - Coronary artery disease  Mother  Father     Procedure history:    Polysomnogram-Titration Study on 3/31/2015 at 64 Years.  Comments:  2015 7:19 PM - Lesli Iyer CMA  CPAP titrated to +11cm H2O which yielded AHI of 2.3/hr and eliminated snoring  Colonoscopy ([id number]) on 2013 at 62 Years.  Comments:  2015 8:51 AM - Isatu Rodriguez  Repeat in 10 years.  Hx of removal of cyst (V15.29) on 2011 at 60 Years.  Colonoscopy ([id number]) on 2008 at 58 Years.  Colonoscopy and biopsy of colon ([id number]) on 2004 at 54 Years.  Total left knee arthroplasty in the month of 2004 at 53 Years.  Excision of corneal lesion ([id number]) on 10/4/1999 at 49 Years.  Comments:  2015 8:55 AM - Isatu Rodriguez  Left.  Left knee debridement in  at 47 Years.  Incidental appendectomy ([id number]) in  at 42 Years.  D&C - Dilatation and curettage (9767387522) in  at 35 Years.  Hysterectomy ([id number]) in  at 33 Years.   section ([id number]) in  at 33 Years.  Tonsillectomy (808997610).      Physical Examination   Vital Signs   2017 11:23 AM CST Temperature Tympanic 97.5 DegF  LOW    Peripheral Pulse Rate 88 bpm    Pulse Site Radial artery    HR Method  Manual    Systolic Blood Pressure 136 mmHg    Diastolic Blood Pressure 84 mmHg    Mean Arterial Pressure 101 mmHg    BP Site Left arm    BP Method Manual      Measurements from flowsheet : Measurements   1/9/2017 11:23 AM CST Height Measured - Standard 61.5 in    Weight Measured - Standard 233.4 lb    BSA 2.14 m2    Body Mass Index 43.38 kg/m2      General:  Alert and oriented, No acute distress.    Integumentary:  2cm x 1cm oval raised scaly lesion with some areas of darker pigmentation on a mostly tan base.       Procedure   Dermatology Surgical Procedure   Confirmed: patient, procedure, side, and site are correct, safety procedures followed.     Indication: remove lesion.     Procedure performed: shave biopsy.     Shave biopsy: sterile preparation of site with 70 % alcohol, Anesthesia (1% xylocaine, with epinephrine), 1  lesions biopsied, specimen obtained and sent to pathology, hemostasis with drysol, right upper thigh using flexible blade.     Complications: none.     Procedure tolerated: well.        Impression and Plan   Diagnosis     Inflamed seborrheic keratosis (TKD47-JE L82.0).     Course:  Worsening.    Plan:  Will contact patient with results of biopsy.

## 2022-02-16 NOTE — NURSING NOTE
Phone Message    PCP:   CHARLES       Time of Call:  10:34 am       Person Calling:  pt  Phone number:  [phone]    Returned call at: 11:24 am    Note:   Pt calls requesting antibotic. Per MONICA, ok to come now. Pt will come now to be seen.

## 2022-02-16 NOTE — PROGRESS NOTES
Patient:   CARLINE SINGH            MRN: [id number]            FIN: [id number]               Age:   69 years     Sex:  Female     :  1950   Associated Diagnoses:   Acute maxillary sinusitis   Author:   Kt Seo MD      Visit Information      Date of Service: 2020 10:50 am  Performing Location: [facility]  Encounter#: [id number]      Primary Care Provider (PCP):  Kt Seo MD    NPI# [id number]      Referring Provider:  Kt Seo MD    NPI# [id number]   Visit type:  Telephone Encounter.    Source of history:  Patient.    Location of patient:  home  Call Start Time:   239 pm  Call End Time:    _246 pm      Chief Complaint   2020 2:16 PM CDT    phone visit: possible sinus, sore throat, congestion, runny nose, cough, no fever, verbal consent for visit   _      History of Present Illness   Today's visit was conducted via telephone due to the COVID-19 pandemic. Patient's consent to telephone visit was obtained and documented.      Reason for visit:  patient with sinus pressure worsening past 72 hours  Chief complaint and symptoms reviewed with patient and confirmed as above.  patient notes frontal and maxillary sinusitis started end of last week, came out of nowhere  has been trying allergy medication without improvement  no fevers, very fatigued, no achiness, no GI symptoms  consistent with prior sinus infections_      Impression and Plan   Diagnosis     Acute maxillary sinusitis (TAL15-LM J01.01).     Course:  Worsening.    Plan:  Signs and symptoms and over the counter treatment of congestion discussed.  Should resolve over 5-7 days from start of antibiotic.  Return to clinic if severe headache, difficulty swallowing, chest pain, or if symptoms become more severe or any other concerns.  Call with questions..    Orders     Orders (Selected)   Prescriptions  Prescribed  Bactrim  mg-160 mg oral tablet: 1 tab(s), PO, BID, # 20 tab(s), 0 Refill(s), Type:  Maintenance, Pharmacy: [facility], 1 tab(s) Oral bid,x10 day(s), 61.25, in, 08/16/19 13:24:00 CDT, Height Measured, 231.6, lb, 08/16/19 13:2....        Health Status   Allergies:    Allergic Reactions (Selected)  Severity Not Documented  Ciprofloxacin (No reactions were documented)  Penicillin (No reactions were documented)   Medications:  (Selected)   Prescriptions  Prescribed  IBUPROFEN 600MG TABLETS: 1 tab(s), Oral, q8 hrs, PRN: AS NEEDED FOR PAIN, # 90 tab(s), Type: Soft Stop, Pharmacy: [id number]  Replacement CPAP +12cm H2o: Replacement CPAP +12cm H2o, See Instructions, Instructions: CPAP machine, humidifier, heated tubing, filters, nasal or full face mask with cushion replacment, Supply, # 1 EA, 0 Refill(s), Type: Maintenance  dilTIAZem 240 mg/24 hours oral capsule, extended release: = 1 cap(s) ( 240 mg ), PO, Daily, # 90 cap(s), 3 Refill(s), Type: Maintenance, Pharmacy: [id number], 1 cap(s) Oral daily  furosemide 40 mg oral tablet: = 1 tab(s) ( 40 mg ), PO, Daily, PRN: as needed, # 90 tab(s), 1 Refill(s), Type: Maintenance, Pharmacy: [facility] [id number], 1 tab(s) Oral daily,PRN:as needed  ibuprofen 600 mg oral tablet: 1 tab(s), Oral, q8 hrs, PRN: AS NEEDED FOR PAIN, # 90 tab(s), 2 Refill(s), Type: Soft Stop, Pharmacy: [facility] [id number]  losartan 100 mg oral tablet: 0.5 tab, po, daily, # 90 tab(s), 3 Refill(s), Type: Maintenance, Pharmacy: [facility] [id number], 0.5 tab Oral daily  metroNIDAZOLE 500 mg oral tablet: = 1 tab(s) ( 500 mg ), PO, q8hr, # 30 tab(s), 0 Refill(s), Type: Maintenance, Pharmacy: [id number], 1 tab(s) Oral q8 hrs,x10 day(s)  metroNIDAZOLE 500 mg oral tablet: = 1 tab(s) ( 500 mg ), PO, q8hr, # 30 tab(s), 0 Refill(s), Type: Maintenance, Pharmacy: [id number], 1 tab(s) Oral q8 hrs,x10 day(s)  nystatin 100,000 units/g topical powder: 1 deep, TOP, TID,  Instructions: apply to affected area until healed, # 30 g, 5 Refill(s), Type: Maintenance, Pharmacy: [id number], 1 deep Topical tid,Instr:apply to affected area until healed  omeprazole 20 mg oral delayed release capsule: = 1 cap(s) ( 20 mg ), po, daily, # 90 cap(s), 3 Refill(s), Type: Maintenance, Pharmacy: [facility] [address], 1 cap(s) Oral daily  Documented Medications  Documented  PreserVision: po, bid, 0 Refill(s), Type: Maintenance  Vitamin B Complex 100: 1tab, daily, 0 Refill(s), Type: Maintenance  Vitamin D3 1000 intl units oral capsule: 1 cap(s) ( 1,000 International Unit ), po, daily, 0 Refill(s), Type: Maintenance  aspirin 81 mg oral tablet: 1 tab(s) ( 81 mg ), PO, Daily, # 90 tab(s), 0 Refill(s), Type: Maintenance  mometasone 0.1% topical cream: Topical, daily, 0 Refill(s), Type: Maintenance  turmeric: ( 500 mg ), daily, 0 Refill(s), Type: Maintenance   Problem list:    All Problems  Changing skin lesion / SNOMED CT 9560932742 / Confirmed  Hypercholesteremia / SNOMED CT 521230089 / Confirmed  Hypertension / SNOMED CT 7406279598 / Confirmed  Moderate major depression / SNOMED CT [id number] / Confirmed  Morbid obesity / SNOMED CT 630766839 / Probable      Histories   Past Medical History:    Active  Hypertension (SNOMED CT 3008801267)  Hypercholesteremia (SNOMED CT 853728444)  Morbid obesity (SNOMED CT 581024969)  Moderate major depression (SNOMED CT [id number]-[id number]-[id number])  Changing skin lesion (SNOMED CT 4990658312)  Resolved  Sleep apnea (SNOMED CT 178794414):  Resolved on 3/8/2017 at 66 years.  Pregnancy (SNOMED CT 471868862):  Resolved on 11/26/1983 at 33 years.   Family History:    MI  Mother  Father  Melanoma  Mother  Sister  Diabetes mellitus  Brother (Eliud)  CA - Cancer of colon  Uncle (M)  CAD - Coronary artery disease  Mother  Father     Procedure history:    Polysomnogram-Titration Study on 3/31/2015 at 64 Years.  Comments:  4/8/2015  7:19 PM CDT - Shireen Lesli WATT  CPAP titrated to +11cm H2O which yielded AHI of 2.3/hr and eliminated snoring  Colonoscopy (777746312) on 2013 at 62 Years.  Comments:  2015 8:51 AM MAURICIO - Isatu Rodriguez  Repeat in 10 years.  Hx of removal of cyst (V15.29) on 2011 at 60 Years.  Colonoscopy (259512442) on 2008 at 58 Years.  Colonoscopy and biopsy of colon (5011301933) on 2004 at 54 Years.  Total left knee arthroplasty in the month of 2004 at 53 Years.  Excision of corneal lesion (760954611) on 10/4/1999 at 49 Years.  Comments:  2015 8:55 AM CST - Isatu Rodriguez  Left.  Left knee debridement in  at 47 Years.  Incidental appendectomy (076823124) in  at 42 Years.  D&C - Dilatation and curettage (0138214444) in  at 35 Years.  Hysterectomy (711643468) in  at 33 Years.   section (49094280) in  at 33 Years.  Tonsillectomy (831256446).   Social History:        Alcohol Assessment            Past                     Comments:                      2017 - Hanna Francis LPN                     Denies alcohol use.      Tobacco Assessment: Denies Tobacco Use            Never      Substance Abuse Assessment: Denies Substance Abuse            Never      Employment and Education Assessment            Retired      Home and Environment Assessment            Marital status:  (Living together).  Spouse/Partner name: Marvel.  Lives with Spouse.  4 children.               Living situation: Home/Independent.      Nutrition and Health Assessment            Type of diet: Weight Watchers.      Exercise and Physical Activity Assessment: Regular exercise            Exercise frequency: 3-4 times/week.  Exercise type: Water aerobics.      Sexual Assessment            Sexually active: Yes.  Sexual orientation: Heterosexual.  Other contraceptive use: Hysterectomy.      Other Assessment            First menses age 13.  Regular menses.                     Comments:                       02/26/2015 - Michael Isatu                     Wears glasses.

## 2022-02-16 NOTE — PROGRESS NOTES
Patient:   CARLINE SINGH            MRN: [id number]            FIN: [id number]               Age:   67 years     Sex:  Female     :  1950   Associated Diagnoses:   Acute recurrent maxillary sinusitis   Author:   Kt Seo MD      Chief Complaint   2018 12:58 PM CDT    green mucus, cough, fatigue, HA, achy since Last thursday        History of Present Illness             The patient presents with sinus problem.  The sinus problem is located in the maxillary sinus.  The sinus problem is characterized by nasal congestion and headache.  The severity of the sinus problem is moderate.  The sinus problem is constant and is worsening.  The sinus problem has lasted for 6 day(s).  The context of the sinus problem: occurred in association with illness.  Associated symptoms consist of cough, ear pain, sore throat and denies fever.        Health Status   Allergies:    Allergic Reactions (All)  Severity Not Documented  Ciprofloxacin (No reactions were documented)  Penicillin (No reactions were documented)   Medications:  (Selected)   Prescriptions  Prescribed  Replacement CPAP +12cm H2o: Replacement CPAP +12cm H2o, See Instructions, Instructions: CPAP machine, humidifier, heated tubing, filters, nasal or full face mask with cushion replacment, Supply, # 1 EA, 0 Refill(s), Type: Maintenance  buPROPion 150 mg/24 hours (XL) oral tablet, extended release: 1 tab(s) ( 150 mg ), po, q 24 hrs, # 90 tab(s), 3 Refill(s), Type: Maintenance, Pharmacy: [id number], 1 tab(s) po q 24 hrs  dilTIAZem 240 mg/24 hours oral capsule, extended release: 1 cap(s) ( 240 mg ), PO, Daily, # 90 cap(s), 3 Refill(s), Type: Maintenance, Pharmacy: [id number], 1 cap(s) po daily  furosemide 40 mg oral tablet: 1 tab(s) ( 40 mg ), PO, Daily, PRN: as needed, # 90 tab(s), 1 Refill(s), Type: Maintenance, [id number], 1 tab(s) po daily,PRN:as needed  ibuprofen 600 mg oral tablet: See Instructions,  Instructions: TAKE ONE TABLET BY MOUTH EVERY EIGHT HOURS AS NEEDED FOR PAIN, # 90 tab(s), 2 Refill(s), Type: Soft Stop, Pharmacy: [facility] #[id number], TAKE ONE TABLET BY MOUTH EVERY EIGHT HOURS AS NEEDED FOR PAIN  losartan 100 mg oral tablet: 1 tab(s) ( 100 mg ), po, daily, # 90 tab(s), 3 Refill(s), Type: Maintenance, Pharmacy: [facility] #[id number], 1 tab(s) po daily  omeprazole 20 mg oral delayed release capsule: 1 cap(s) ( 20 mg ), po, daily, # 90 cap(s), 3 Refill(s), Type: Maintenance, Pharmacy: [facility], 1 cap(s) po daily  Documented Medications  Documented  PreserVision: po, bid, 0 Refill(s), Type: Maintenance  Vitamin B Complex 100: 1tab, daily, 0 Refill(s), Type: Maintenance  Vitamin D3 1000 intl units oral capsule: 1 cap(s) ( 1,000 International Unit ), po, daily, 0 Refill(s), Type: Maintenance  aspirin 81 mg oral tablet: 1 tab(s) ( 81 mg ), PO, Daily, # 90 tab(s), 0 Refill(s), Type: Maintenance  turmeric: ( 500 mg ), daily, 0 Refill(s), Type: Maintenance   Problem list:    All Problems (Selected)  Changing skin lesion / SNOMED CT 4116921468 / Confirmed  Hypertension / SNOMED CT 3108059683 / Confirmed  Moderate major depression / SNOMED CT [id number]-[id number] / Confirmed  Morbid obesity / SNOMED CT 419830232 / Probable  Hypercholesteremia / SNOMED CT 684145737 / Confirmed      Histories   Past Medical History:    Active  Hypertension (SNOMED CT 6930935889)  Hypercholesteremia (SNOMED CT 258139566)  Morbid obesity (SNOMED CT 684574613)  Moderate major depression (SNOMED CT F7F9760R-[id number]-TL14-4DIR779TF229)  Resolved  Sleep apnea (SNOMED CT 891461347):  Resolved on 3/8/2017 at 66 years.  Pregnancy (SNOMED CT 014106926):  Resolved on 11/26/1983 at 33 years.   Family History:    MI  Mother  Father  Melanoma  Mother  Sister  Diabetes mellitus  Brother (Eliud)  CA - Cancer of colon  Uncle (M)  CAD - Coronary artery disease  Mother  Father     Procedure history:     Polysomnogram-Titration Study on 3/31/2015 at 64 Years.  Comments:  2015 7:19 PM - Lesli Iyer CMA  CPAP titrated to +11cm H2O which yielded AHI of 2.3/hr and eliminated snoring  Colonoscopy (832410512) on 2013 at 62 Years.  Comments:  2015 8:51 AM - Isatu Rodriguez  Repeat in 10 years.  Hx of removal of cyst (V15.29) on 2011 at 60 Years.  Colonoscopy (643700733) on 2008 at 58 Years.  Colonoscopy and biopsy of colon (9939868769) on 2004 at 54 Years.  Total left knee arthroplasty in the month of 2004 at 53 Years.  Excision of corneal lesion (404952081) on 10/4/1999 at 49 Years.  Comments:  2015 8:55 AM - Isatu Rodriguez  Left.  Left knee debridement in  at 47 Years.  Incidental appendectomy (906830564) in  at 42 Years.  D&C - Dilatation and curettage (7874093630) in  at 35 Years.  Hysterectomy (133339555) in  at 33 Years.   section (61334728) in  at 33 Years.  Tonsillectomy (087180890).   Social History:        Alcohol Assessment            Past                     Comments:                      2017 - Hanna Francis LPN                     Denies alcohol use.      Tobacco Assessment: Denies Tobacco Use            Never      Substance Abuse Assessment: Denies Substance Abuse            Never      Employment and Education Assessment            Part time      Home and Environment Assessment            Marital status:  (Living together).  Spouse/Partner name: Marvel.  Lives with Spouse.  4 children.               Living situation: Home/Independent.      Nutrition and Health Assessment            Type of diet: Regular.      Exercise and Physical Activity Assessment: Regular exercise            Exercise frequency: 3-4 times/week.  Exercise type: Water aerobics.      Sexual Assessment            Sexually active: Yes.  Sexual orientation: Heterosexual.  Other contraceptive use: Hysterectomy.      Other Assessment            First menses age 13.   Regular menses.                     Comments:                      02/26/2015 - MichaelIsatu archuleta                     Wears glasses.        Physical Examination   Vital Signs   9/4/2018 12:58 PM CDT Temperature Tympanic 99.1 DegF    Peripheral Pulse Rate 104 bpm  HI    HR Method Electronic    Systolic Blood Pressure 122 mmHg    Diastolic Blood Pressure 82 mmHg  HI    Mean Arterial Pressure 95 mmHg    BP Site Left arm    BP Method Manual    Oxygen Saturation 95 %      Measurements from flowsheet : Measurements   9/4/2018 12:58 PM CDT    Weight Measured - Standard                226.2 lb     General:  Alert and oriented, No acute distress.    Eye:  Pupils are equal, round and reactive to light, Normal conjunctiva.    HENT:  Normocephalic, Tympanic membranes are clear, Oral mucosa is moist, No pharyngeal erythema.         Sinus: Bilateral, Maxillary sinus, Tenderness.    Neck:  Supple, Non-tender.    Respiratory:  Lungs are clear to auscultation.    Cardiovascular:  Normal rate, Regular rhythm.       Impression and Plan   Diagnosis     Acute recurrent maxillary sinusitis (BAA26-ZF J01.01).     Plan:  Signs and symptoms and over the counter treatment of congestion discussed.  Should resolve over 5-7 days from start of antibiotic.  Return to clinic if severe headache, difficulty swallowing, chest pain, or if symptoms become more severe or any other concerns.  Call with questions..    Orders     Orders   Pharmacy:  Zithromax 250 mg oral tablet (Prescribe): = 1 packet(s), PO, Once, Instructions: as directed on package labeling. Two tablets po on day one, then one tablet daily x four days, # 6 tab(s), 0 Refill(s), Type: Soft Stop, Pharmacy: [facility] #[id number], 1 packet(s) Oral once,Instr:as directed on package labeling. Two tablets po on day one, then one tablet daily x four days.

## 2022-02-16 NOTE — LETTER
(Inserted Image. Unable to display)   January 16, 2019      CARLINE SINGH  PO   [place], [place] [id number]        Dear CARLINE,      Thank you for selecting [facility] ([facility]viously [facility]) for your healthcare needs.     Our records indicate you are due for the following services:     Medication Check    To schedule an appointment or if you have further questions, please contact your primary clinic:   [facility]          [phone]   [facility]    [phone]             [facility]         [phone]      Powered by [employer]    Sincerely,    Kt Seo M.D.

## 2022-02-16 NOTE — TELEPHONE ENCOUNTER
---------------------  From: Sylvia Horvath CMA ([facility] Pool ([id number]))   To: Kt Seo MD;     Sent: 3/7/2019 11:18:23 AM CST  Subject: FW: Medication Management   Due Date/Time: 3/8/2019 11:06:00 AM CST           Entered by Sylvia Horvath CMA on March 07, 2019 11:18:17 AM CST  last seen: 2/27/19 med check  last filled: 7/30/18 #90, 2 refills  Patient called as well for med refill. Please advise.      ** Patient matched by Sylvia Horvath CMA on 3/7/2019 11:17:27 AM CST **      ------------------------------------------  From: [facility] [id number]  To: Kt Seo MD  Sent: March 7, 2019 11:06:37 AM CST  Subject: Medication Management  Due: March 8, 2019 11:06:37 AM CST    ** On Hold Pending Signature **  Drug: IBUPROFEN 600MG TABLETS  TAKE 1 TABLET BY MOUTH EVERY 8 HOURS AS NEEDED FOR PAIN  Quantity: 90 tab(s)     Days Supply: 30        Refills: 0  Substitutions Allowed  Notes from Pharmacy:     Dispensed Drug: IBUPROFEN 600MG TABLETS  TAKE 1 TABLET BY MOUTH EVERY 8 HOURS AS NEEDED FOR PAIN  Quantity: 90 tab(s)     Days Supply: 30        Refills: 0  Substitutions Allowed  Notes from Pharmacy:   ---------------------------------------------------------------  From: Kt Seo MD   To: [facility] [id number]    Sent: 3/7/2019 11:26:21 AM CST  Subject: FW: Medication Management     ** Approved **  Freetext Med (IBUPROFEN 600MG TABLETS)  TAKE 1 TABLET BY MOUTH EVERY 8 HOURS AS NEEDED FOR PAIN  Qty:  90 tab(s)        Days Supply:  30        Refills:  0          Substitutions Allowed     [place] [facility] [place] [address]

## 2022-02-16 NOTE — PROGRESS NOTES
"   Patient:   CARLINE SINGH            MRN: [id number]            FIN: [id number]               Age:   70 years     Sex:  Female     :  1950   Associated Diagnoses:   Head cold; Peripheral edema   Author:   Memo DAUGHERTY, Cole ZURITA      Visit Information      Date of Service: 2021 12:20 pm  Performing Location: [facility]  Encounter#: [id number]      Primary Care Provider (PCP):  Kt Seo MD    NPI# [id number]   Visit type:  Telephone Encounter.    Source of history:  Patient.    Location of patient:  _home  Call Start Time:   _1302  Call End Time:    _131      Chief Complaint   2021 12:50 PM CDT   Verbal consent given for a telephone visit.  Pt is c/o cough,sore throat and runny nose x 2-3 days  Pt alos c/o Right leg edema and pain on and off x \"several months\"     _      History of Present Illness   Today's visit was conducted via telephone due to the COVID-19 pandemic. Patient's consent to telephone visit was obtained and documented.      Reason for visit:  _ 3 day hxof cough, sore throat, runny nose  also right leg edema and pain intermittently for several months  she has been baby sitting her grand daughter recently  she has been using OTC cold and cough medicines    she has not been her furosemide regularly  she has been elevating her legs about twice daily         Impression and Plan   Diagnosis     Head cold (UJG17-YA J00).     Peripheral edema (HJV91-GE R60.9).     Summary:  advised to continue taking OTC cough and cold medications for her head cold, follow up if worsening    for her peripheral edema, advised to take her furosemide regularly, keep her legs elevated when she can, make sure   her blood pressure is under good control (our last BP reading for her was in 2019), follow up if not improving.    Orders     Orders   Charges (Evaluation and Management):  [id number] office o/p est low 20-29 min (Charge) (Order): Quantity: 1, Head cold  Peripheral edema.        Health " Status   Allergies:    Allergic Reactions (Selected)  Severity Not Documented  Ciprofloxacin (No reactions were documented)  Penicillin (No reactions were documented)   Medications:  (Selected)   Prescriptions  Prescribed  Replacement CPAP +12cm H2o: Replacement CPAP +12cm H2o, See Instructions, Instructions: CPAP machine, humidifier, heated tubing, filters, nasal or full face mask with cushion replacment, Supply, # 1 EA, 0 Refill(s), Type: Maintenance  dilTIAZem 240 mg/24 hours oral capsule, extended release: = 1 cap(s) ( 240 mg ), PO, Daily, # 90 cap(s), 3 Refill(s), Type: Maintenance, [facility] [id number], 1 cap(s) Oral daily  furosemide 40 mg oral tablet: = 1 tab(s) ( 40 mg ), PO, Daily, PRN: as needed, # 90 tab(s), 1 Refill(s), Type: Maintenance, [facility] [id number], 1 tab(s) Oral daily,PRN:as needed  ibuprofen 600 mg oral tablet: 1 tab(s), Oral, q8 hrs, PRN: AS NEEDED FOR PAIN, # 90 tab(s), 2 Refill(s), Type: Soft Stop, [facility]: [facility] [id number]  losartan 100 mg oral tablet: 0.5 tab, po, daily, # 90 tab(s), 3 Refill(s), Type: Maintenance, [facility]: [facility] [id number], 0.5 tab Oral daily  nystatin 100,000 units/g topical powder: 1 deep, TOP, TID, Instructions: apply to affected area until healed, # 30 g, 5 Refill(s), Type: Maintenance, [facility] [address], 1 deep Topical tid,Instr:apply to affected area until healed  omeprazole 20 mg oral delayed release capsule: = 1 cap(s) ( 20 mg ), po, daily, # 90 cap(s), 3 Refill(s), Type: Maintenance, [id number], 1 cap(s) Oral daily  Documented Medications  Documented  PreserVision: po, bid, 0 Refill(s), Type: Maintenance  Vitamin B Complex 100: 1tab, daily, 0 Refill(s), Type: Maintenance  Vitamin D3 1000 intl units oral capsule: 1 cap(s) ( 1,000 International Unit ), po, daily, 0 Refill(s), Type: Maintenance  aspirin 81 mg oral tablet: 1 tab(s) ( 81 mg ), PO, Daily, # 90 tab(s), 0  Refill(s), Type: Maintenance  turmeric: ( 500 mg ), daily, 0 Refill(s), Type: Maintenance   Problem list:    All Problems  Hypertension / SNOMED CT 6032880220 / Confirmed  Changing skin lesion / SNOMED CT 9544211701 / Confirmed  Morbid obesity / SNOMED CT 192200644 / Probable  Hypercholesteremia / SNOMED CT 559243176 / Confirmed  Moderate major depression / SNOMED CT [id number] / Confirmed      Histories   Past Medical History:    Active  Hypertension (6439185868)  Hypercholesteremia (775385797)  Morbid obesity (658170859)  Moderate major depression ([id number])  Changing skin lesion (4828528391)  Resolved  Sleep apnea (770924681):  Resolved on 3/8/2017 at 66 years.  Pregnancy (396358593):  Resolved on 1983 at 33 years.   Family History:    MI  Mother  Father  Melanoma  Mother  Sister  Diabetes mellitus  Brother (Eliud)  CA - Cancer of colon  Uncle (M)  CAD - Coronary artery disease  Mother  Father     Procedure history:    Polysomnogram-Titration Study on 3/31/2015 at 64 Years.  Comments:  2015 7:19 PM CDT - Lesli Iyer CMA  CPAP titrated to +11cm H2O which yielded AHI of 2.3/hr and eliminated snoring  Colonoscopy ([id number]) on 2013 at 62 Years.  Comments:  2015 8:51 AM Isatu Forrester  Repeat in 10 years.  Hx of removal of cyst (V15.29) on 2011 at 60 Years.  Colonoscopy (373887797) on 2008 at 58 Years.  Colonoscopy and biopsy of colon (8795304821) on 2004 at 54 Years.  Total left knee arthroplasty in the month of 2004 at 53 Years.  Excision of corneal lesion (551734474) on 10/4/1999 at 49 Years.  Comments:  2015 8:55 AM Isatu Forrester  Left.  Left knee debridement in  at 47 Years.  Incidental appendectomy (960096938) in  at 42 Years.  D&C - Dilatation and curettage (4961986790) in  at 35 Years.  Hysterectomy (004788199) in  at 33 Years.   section (87156757) in  at 33  Years.  Tonsillectomy (041427985).   Social History:        Electronic Cigarette/Vaping Assessment            Electronic Cigarette Use: Never.      Alcohol Assessment            Past                     Comments:                      03/08/2017 - Hanna Francis LPN                     Denies alcohol use.      Tobacco Assessment: Denies Tobacco Use            Never            Never (less than 100 in lifetime)      Substance Abuse Assessment: Denies Substance Abuse            Never      Employment and Education Assessment            Retired      Home and Environment Assessment            Marital status:  (Living together).  Spouse/Partner name: Marvel.  Lives with Spouse.  4 children.               Living situation: Home/Independent.      Nutrition and Health Assessment            Type of diet: Weight Watchers.      Exercise and Physical Activity Assessment: Regular exercise            Exercise frequency: 3-4 times/week.  Exercise type: Water aerobics.      Sexual Assessment            Sexually active: Yes.  Sexual orientation: Heterosexual.  Other contraceptive use: Hysterectomy.      Other Assessment            First menses age 13.  Regular menses.                     Comments:                      02/26/2015 - Isatu Rodriguez                     Wears glasses.

## 2022-02-16 NOTE — TELEPHONE ENCOUNTER
---------------------  From: Gabbi OAKES Kt   Sent: 3/14/2019 9:04:18 AM CDT  Subject: lab and ultrasound results     Called patient with negative ultrasound for DVT, normal labs. Will try compression stocking, increasing exercise. Will keep me posted.        Results:  Date Result Name Ind Value Ref Range   3/13/2019 11:45 AM Sodium Level  140 mmol/L (135 - 145)   3/13/2019 11:45 AM Potassium Level  4.4 mmol/L (3.5 - 5.0)   3/13/2019 11:45 AM Chloride Level  101 mmol/L (98 - 110)   3/13/2019 11:45 AM CO2 Level  26 mmol/L (21 - 31)   3/13/2019 11:45 AM AGAP  13 (5 - 18)   3/13/2019 11:45 AM Glucose Level ((H)) 136 mg/dL (65 - 100)   3/13/2019 11:45 AM BUN ((H)) 33 mg/dL (8 - 25)   3/13/2019 11:45 AM Creatinine Level  0.73 mg/dL (0.57 - 1.11)   3/13/2019 11:45 AM BUN/Creat Ratio ((H)) 45 (10 - 20)   3/13/2019 11:45 AM eGFR   >60 (>60 - )   3/13/2019 11:45 AM eGFR Non-African American  >60 (>60 - )   3/13/2019 11:45 AM Calcium Level  9.7 mg/dL (8.5 - 10.5)   3/13/2019 11:45 AM Bilirubin Total  0.5 mg/dL (0.2 - 1.2)   3/13/2019 11:45 AM Bilirubin Direct  0.2 mg/dL (0.1 - 0.5)   3/13/2019 11:45 AM Bilirubin Indirect  0.3 mg/dL (0.2 - 0.8)   3/13/2019 11:45 AM Alkaline Phosphatase  93 IU/L (50 - 136)   3/13/2019 11:45 AM AST/SGOT  19 IU/L (2 - 40)   3/13/2019 11:45 AM ALT/SGPT  25 IU/L (8 - 45)   3/13/2019 11:45 AM Protein Total  7.2 g/dL (6.0 - 8.0)   3/13/2019 11:45 AM Albumin Level  4.4 g/dL (3.2 - 4.6)   3/13/2019 11:45 AM Globulin  2.8 g/dL (2.0 - 3.7)   3/13/2019 11:45 AM A/G Ratio  1.6 (1.0 - 2.0)   3/13/2019 11:45 AM B-Natriuretic Peptide (BNP)  <10 pg/mL ( - <150)

## 2022-02-16 NOTE — NURSING NOTE
Comprehensive Intake Entered On:  3/13/2019 11:22 AM CDT    Performed On:  3/13/2019 11:17 AM CDT by Tracy Stafford CMA               Summary   Chief Complaint :   swelling and pain in right leg; back of calf area   Menstrual Status :   Hysterectomy   Weight Measured :   235.6 lb(Converted to: 235 lb 10 oz, 106.87 kg)    Height Measured :   61.25 in(Converted to: 5 ft 1 in, 155.57 cm)    Body Mass Index :   44.15 kg/m2 (HI)    Body Surface Area :   2.15 m2   Systolic Blood Pressure :   126 mmHg   Diastolic Blood Pressure :   70 mmHg   Mean Arterial Pressure :   89 mmHg   Peripheral Pulse Rate :   66 bpm   BP Method :   Manual   HR Method :   Manual   Tracy Stafford CMA - 3/13/2019 11:17 AM CDT   Health Status   Allergies Verified? :   Yes   Medication History Verified? :   Yes   Immunizations Current :   Yes   Medical History Verified? :   Yes   Pre-Visit Planning Status :   Completed   Tobacco Use? :   Never smoker   Tracy Stafford CMA - 3/13/2019 11:17 AM CDT   Consents   Consent for Immunization Exchange :   Consent Granted   Consent for Immunizations to Providers :   Consent Granted   Tracy Stafford CMA - 3/13/2019 11:17 AM CDT   Meds / Allergies   (As Of: 3/13/2019 11:22:21 AM CDT)   Allergies (Active)   ciprofloxacin  Estimated Onset Date:   Unspecified ; Created By:   Isatu Rodriguez; Reaction Status:   Active ; Category:   Drug ; Substance:   ciprofloxacin ; Type:   Allergy ; Updated By:   Isatu Rodriguez; Reviewed Date:   3/13/2019 11:19 AM CDT      penicillin  Estimated Onset Date:   Unspecified ; Created By:   Hanna Francis LPN; Reaction Status:   Active ; Category:   Drug ; Substance:   penicillin ; Type:   Allergy ; Updated By:   Hanna Francis LPN; Reviewed Date:   3/13/2019 11:19 AM CDT        Medication List   (As Of: 3/13/2019 11:22:21 AM CDT)   Prescription/Discharge Order    benzonatate  :   benzonatate ; Status:   Prescribed ; Ordered As Mnemonic:   Tessalon Perles 100 mg oral capsule ; Simple Display  Line:   200 mg, 2 cap(s), PO, TID, for 7 day(s), 42 cap(s), 0 Refill(s) ; Ordering Provider:   Renaldo Nair PA-C; Catalog Code:   benzonatate ; Order Dt/Tm:   12/20/2018 11:22:32 AM          buPROPion  :   buPROPion ; Status:   Prescribed ; Ordered As Mnemonic:   buPROPion 150 mg/24 hours (XL) oral tablet, extended release ; Simple Display Line:   150 mg, 1 tab(s), po, q 24 hrs, 90 tab(s), 3 Refill(s) ; Ordering Provider:   Kt Seo MD; Catalog Code:   buPROPion ; Order Dt/Tm:   10/3/2018 9:25:53 AM          dilTIAZem  :   dilTIAZem ; Status:   Prescribed ; Ordered As Mnemonic:   dilTIAZem 240 mg/24 hours oral capsule, extended release ; Simple Display Line:   240 mg, 1 cap(s), PO, Daily, 90 cap(s), 3 Refill(s) ; Ordering Provider:   Kt Seo MD; Catalog Code:   dilTIAZem ; Order Dt/Tm:   2/27/2019 9:52:45 AM          furosemide  :   furosemide ; Status:   Prescribed ; Ordered As Mnemonic:   furosemide 40 mg oral tablet ; Simple Display Line:   40 mg, 1 tab(s), PO, Daily, PRN: as needed, 90 tab(s), 1 Refill(s) ; Ordering Provider:   Kt Seo MD; Catalog Code:   furosemide ; Order Dt/Tm:   2/27/2019 9:52:44 AM          ibuprofen 600 mg oral tablet  :   ibuprofen 600 mg oral tablet ; Status:   Prescribed ; Ordered As Mnemonic:   ibuprofen 600 mg oral tablet ; Simple Display Line:   See Instructions, TAKE ONE TABLET BY MOUTH EVERY EIGHT HOURS AS NEEDED FOR PAIN, 90 tab(s), 2 Refill(s) ; Ordering Provider:   Kt Seo MD; Catalog Code:   ibuprofen ; Order Dt/Tm:   7/30/2018 2:07:40 PM          losartan  :   losartan ; Status:   Prescribed ; Ordered As Mnemonic:   losartan 100 mg oral tablet ; Simple Display Line:   0.5 tab, po, daily, 90 tab(s), 3 Refill(s) ; Ordering Provider:   Kt Seo MD; Catalog Code:   losartan ; Order Dt/Tm:   2/27/2019 9:52:45 AM          metroNIDAZOLE  :   metroNIDAZOLE ; Status:   Prescribed ; Ordered As Mnemonic:   metroNIDAZOLE 500 mg oral tablet ;  Simple Display Line:   500 mg, 1 tab(s), PO, q8hr, for 10 day(s), 30 tab(s), 0 Refill(s) ; Ordering Provider:   Kt Seo MD; Catalog Code:   metroNIDAZOLE ; Order Dt/Tm:   2/27/2019 10:03:04 AM          Misc Prescription  :   Misc Prescription ; Status:   Prescribed ; Ordered As Mnemonic:   IBUPROFEN 600MG TABLETS ; Simple Display Line:   1 tab(s), Oral, q8 hrs, PRN: AS NEEDED FOR PAIN, 90 tab(s) ; Ordering Provider:   Kt Seo MD; Catalog Code:   Miscellaneous Prescription ; Order Dt/Tm:   3/7/2019 11:26:21 AM          Miscellaneous Rx Supply  :   Miscellaneous Rx Supply ; Status:   Prescribed ; Ordered As Mnemonic:   Replacement CPAP +12cm H2o ; Simple Display Line:   See Instructions, CPAP machine, humidifier, heated tubing, filters, nasal or full face mask with cushion replacment, 1 EA ; Ordering Provider:   Kt Seo MD; Catalog Code:   Miscellaneous Rx Supply ; Order Dt/Tm:   4/16/2015 11:14:19 AM ; Comment:   Months = 99 (Lifetime)          omeprazole  :   omeprazole ; Status:   Prescribed ; Ordered As Mnemonic:   omeprazole 20 mg oral delayed release capsule ; Simple Display Line:   20 mg, 1 cap(s), po, daily, 90 cap(s), 3 Refill(s) ; Ordering Provider:   Kt Seo MD; Catalog Code:   omeprazole ; Order Dt/Tm:   2/27/2019 9:52:46 AM          sulfamethoxazole-trimethoprim  :   sulfamethoxazole-trimethoprim ; Status:   Prescribed ; Ordered As Mnemonic:   Bactrim  mg-160 mg oral tablet ; Simple Display Line:   1 tab(s), PO, BID, for 10 day(s), 20 tab(s), 0 Refill(s) ; Ordering Provider:   Kt Seo MD; Catalog Code:   sulfamethoxazole-trimethoprim ; Order Dt/Tm:   2/27/2019 10:03:04 AM            Home Meds    aspirin  :   aspirin ; Status:   Documented ; Ordered As Mnemonic:   aspirin 81 mg oral tablet ; Simple Display Line:   81 mg, 1 tab(s), PO, Daily, 90 tab(s) ; Catalog Code:   aspirin ; Order Dt/Tm:   2/18/2015 10:25:30 AM          cholecalciferol  :    cholecalciferol ; Status:   Documented ; Ordered As Mnemonic:   Vitamin D3 1000 intl units oral capsule ; Simple Display Line:   1,000 International Unit, 1 cap(s), po, daily, 0 Refill(s) ; Catalog Code:   cholecalciferol ; Order Dt/Tm:   11/29/2016 12:33:13 PM          mometasone topical  :   mometasone topical ; Status:   Documented ; Ordered As Mnemonic:   mometasone 0.1% topical cream ; Simple Display Line:   Topical, daily, 0 Refill(s) ; Catalog Code:   mometasone topical ; Order Dt/Tm:   2/27/2019 9:41:17 AM          multivitamin  :   multivitamin ; Status:   Documented ; Ordered As Mnemonic:   Vitamin B Complex 100 ; Simple Display Line:   1tab, daily ; Catalog Code:   multivitamin ; Order Dt/Tm:   2/18/2015 10:23:40 AM          multivitamin with minerals  :   multivitamin with minerals ; Status:   Documented ; Ordered As Mnemonic:   PreserVision ; Simple Display Line:   po, bid, 0 Refill(s) ; Catalog Code:   multivitamin with minerals ; Order Dt/Tm:   11/30/2015 9:29:25 AM          turmeric  :   turmeric ; Status:   Documented ; Ordered As Mnemonic:   turmeric ; Simple Display Line:   500 mg, daily, 0 Refill(s) ; Catalog Code:   turmeric ; Order Dt/Tm:   11/29/2016 12:34:06 PM

## 2022-02-16 NOTE — TELEPHONE ENCOUNTER
Entered by Jihan Olivarez CMA on May 20, 2019 12:33:01 PM CDT  ---------------------  From: Jihan Olivarez CMA   To: [facility]    Sent: 5/20/2019 12:33:01 PM CDT  Subject: Medication Management     ** Not Approved: Patient should contact Prescriber first, If refills needed, patient should call the clinic. **  nystatin topical (NYSTOP 100,000 U/GM TOP POWDER 30GM)  APPLY TOPICALLY TO THE AFFECTED AREA THREE TIMES DAILY UNTIL HEALED  Qty:  30 gm        Days Supply:  15        Refills:  0          Substitutions Allowed     Route To Pharmacy - [facility]   Signed by Jihan Olivarez CMA            ------------------------------------------  From: [facility]  To: Kt Seo MD  Sent: May 18, 2019 9:03:27 AM CDT  Subject: Medication Management  Due: May 19, 2019 9:03:27 AM CDT    ** On Hold Pending Signature **  Drug: nystatin topical (nystatin 100,000 units/g topical powder)  1 SIXTO TOPICAL TID,INSTR:APPLY TO AFFECTED AREA UNTIL HEALED  Quantity: 30 unknown unit  Days Supply: 0         Refills: 0  Substitutions Allowed  Notes from Pharmacy:     Dispensed Drug: nystatin topical (Nystop 100,000 units/g topical powder)  APPLY TOPICALLY TO THE AFFECTED AREA THREE TIMES DAILY UNTIL HEALED  Quantity: 30 gm       Days Supply: 15        Refills: 0  Substitutions Allowed  Notes from Pharmacy:   ------------------------------------------

## 2022-02-16 NOTE — PROGRESS NOTES
Patient:   CARLINE SINGH            MRN: [id number]            FIN: [id number]               Age:   66 years     Sex:  Female     :  1950   Associated Diagnoses:   Peripheral edema; Morbid obesity; Hypertension; Bilateral hand pain   Author:   Kt Seo MD      Chief Complaint   2017 3:45 PM CDT     Patient here for bilateral lower leg swelling. Patient reports that the swelling is painful.        History of Present Illness   Patient with worsening bilateral lower extremity swelling. Seems to fluctuate day to day. Takes furosemide several times a month. Took one today but doesn't think it is helping much. Patient with fatigue. Shortness of breath with activity. No chest pain. Generalized achiness in the legs.   Has been struggling to lose weight. Has a hard time exercising.   Discussed pain in her hands. Getting progressively worse. Saw Dr. Schmid last December. Had xrays that we reviewed today. Wondering what her options are.       Health Status   Allergies:    Allergic Reactions (All)  Severity Not Documented  Ciprofloxacin (No reactions were documented)  Penicillin (No reactions were documented)   Medications:  (Selected)   Prescriptions  Prescribed  Replacement CPAP +12cm H2o: Replacement CPAP +12cm H2o, See Instructions, Instructions: CPAP machine, humidifier, heated tubing, filters, nasal or full face mask with cushion replacment, Supply, # 1 EA, 0 Refill(s), Type: Maintenance  Zithromax 250 mg oral tablet: 1 packet(s), PO, Once, Instructions: as directed on package labeling. Two tablets po on day one, then one tablet daily x four days, # 6 tab(s), 0 Refill(s), Type: Soft Stop, Pharmacy: [id number], 1 packet(s) po once,Instr:as directed on pac...  buPROPion 300 mg/24 hours (XL) oral tablet, extended release: 1 tab(s) ( 300 mg ), po, daily, # 90 tab(s), 3 Refill(s), Type: Maintenance, Pharmacy: [facility] #[id number], 1 tab(s) po daily  diltiaZEM 240 mg/24 hours oral capsule,  extended release: 1 cap(s) ( 240 mg ), PO, Daily, # 90 cap(s), 3 Refill(s), Type: Maintenance, Pharmacy: [id number], 1 cap(s) po daily  furosemide 40 mg oral tablet: 1 tab(s) ( 40 mg ), PO, Daily, PRN: as needed, # 90 tab(s), 1 Refill(s), Type: Maintenance, [id number], 1 tab(s) po daily,PRN:as needed  ibuprofen 600 mg oral tablet: See Instructions, Instructions: TAKE ONE TABLET BY MOUTH EVERY EIGHT HOURS, # 90 tab(s), 1 Refill(s), Type: Soft Stop, Pharmacy: [facility] #[id number]  losartan 100 mg oral tablet: 1 tab(s) ( 100 mg ), PO, Daily, # 90 tab(s), 3 Refill(s), Type: Maintenance, Pharmacy: [id number], 1 tab(s) po daily  omeprazole 20 mg oral delayed release tablet: 1 tab(s) ( 20 mg ), po, daily, # 90 tab(s), 3 Refill(s), Type: Maintenance, Pharmacy: [facility] #[id number], 1 tab(s) po daily  Documented Medications  Documented  Josefina-C 500 mg oral tablet: ( 500 mg ), po, daily, 0 Refill(s), Type: Maintenance  Fiber Complex: Fiber Complex, daily, Supply, 0 Refill(s), Type: Maintenance  Fish Oil 1200 mg oral capsule: 1 cap(s) ( 1,200 mg ), po, daily, 0 Refill(s), Type: Maintenance  Hair, Skin & Nails: ( 5 mg ), po, daily, 0 Refill(s), Type: Maintenance  Hi-Potency Quer cetin: Hi-Potency Quer cetin, daily, Supply, 0 Refill(s), Type: Maintenance  Hormone Essentials: Hormone Essentials, daily, Supply, 0 Refill(s), Type: Maintenance  Kelp,B10,Cider vinegar,Lecithin: Kelp,B10,Cider vinegar,Lecithin, daily, Supply, 0 Refill(s), Type: Maintenance  Lemon Peel-full spectrum: Lemon Peel-full spectrum, daily, Supply, 0 Refill(s), Type: Maintenance  Other Prescription: Instructions: Glucosamine, chrondrotin, melatonin supplement., Supply, 0 Refill(s), Type: Maintenance  PreserVision: po, bid, 0 Refill(s), Type: Maintenance  Vagifem 10 mcg vaginal tablet: 1 EA ( 10 mcg ), vag, 2x/wk, 0 Refill(s), Type: Maintenance  Vinpocetine-memory support: Vinpocetine-memory support, ( 30 mg ),  daily, Supply, 0 Refill(s), Type: Maintenance  Vitamin B Complex 100: 1tab, daily, 0 Refill(s), Type: Maintenance  Vitamin C 500 mg oral capsule: 1 cap(s) ( 500 mg ), PO, Daily, # 30 cap(s), 0 Refill(s), Type: Maintenance  Vitamin D3 1000 intl units oral capsule: 1 cap(s) ( 1,000 International Unit ), po, daily, 0 Refill(s), Type: Maintenance  aspirin 81 mg oral tablet: 1 tab(s) ( 81 mg ), PO, Daily, # 90 tab(s), 0 Refill(s), Type: Maintenance  lutein 20 mg oral tablet: 1 tab(s) ( 20 mg ), po, daily, 0 Refill(s), Type: Maintenance  lysine 500 mg oral tablet: 2 tab(s) ( 1,000 mg ), po, daily, 0 Refill(s), Type: Maintenance  methylsulfonylmethane: 0 Refill(s), Type: Maintenance  turmeric: ( 500 mg ), daily, 0 Refill(s), Type: Maintenance   Problem list:    All Problems (Selected)  Changing skin lesion / SNOMED CT 6757425554 / Confirmed  Hypertension / SNOMED CT 5631829173 / Confirmed  Moderate major depression / SNOMED CT [id number] / Confirmed  Morbid obesity / SNOMED CT 423223574 / Probable  Hypercholesteremia / SNOMED CT 454648604 / Confirmed      Physical Examination   Vital Signs   6/5/2017 3:45 PM CDT Temperature Tympanic 97.5 DegF  LOW    Peripheral Pulse Rate 60 bpm    Systolic Blood Pressure 120 mmHg    Diastolic Blood Pressure 64 mmHg    Mean Arterial Pressure 83 mmHg    Oxygen Saturation 96 %      Measurements from flowsheet : Measurements   6/5/2017 3:45 PM CDT Height Measured - Standard 61.5 in    Weight Measured - Standard 240.6 lb    BSA 2.17 m2    Body Mass Index 44.72 kg/m2      General:  Alert and oriented, No acute distress.    Eye:  Pupils are equal, round and reactive to light, Normal conjunctiva.    HENT:  Normocephalic, Oral mucosa is moist, No pharyngeal erythema.    Neck:  Supple, Non-tender, No lymphadenopathy.    Respiratory:  Lungs are clear to auscultation.    Cardiovascular:  Normal rate, Regular rhythm, 2+ edema.       Impression and Plan      Diagnosis    Peripheral edema (EGA61-YN R60.9)   Morbid obesity (NMZ12-MB E66.01)   Hypertension (XDK69-EB I10)   Course:  BP is controlled. Will check labs today.    Orders     Orders (Selected)   Outpatient Orders  Ordered  D-Dimer (Request): Priority: Urgent, Peripheral edema  Morbid obesity  Hypertension  Ordered (In Transit)  B type natriuretic peptide (BNP)* ([facility]): Specimen Type: Plasma, Collection Date: 06/05/17 16:10:00 CDT  CBC (h/h, RBC, indices, WBC, Plt)* ([facility]): Specimen Type: Blood, Collection Date: 06/05/17 16:10:00 CDT  Comprehensive Metabolic Panel* ([facility]): Specimen Type: Serum, Collection Date: 06/05/17 16:10:00 CDT  TSH* ([facility]): Specimen Type: Serum, Collection Date: 06/05/17 16:10:00 CDT.     Diagnosis     Bilateral hand pain (LRS27-IA M79.641).     Plan:  [facility].  Once I receive the reocrds, will call patient regarding recommendations..

## 2022-02-16 NOTE — TELEPHONE ENCOUNTER
---------------------  From: Andre/Taylor VERA (Phone Messages Pool ([id number]_[facility]))   Sent: 6/29/2021 5:00:19 PM CDT  Subject: General Message     Phone Message    PCP: CHARLES    Time of Call: [id number]    Phone Number: [phone]    Returned call at: n/a    Note: Patient calls stating that they transferred care a while ago to [facility]. Patient states that she is not very happy with the care they have been getting there and would like to talk to [facility] about it. She is asking that [facility] give her a call to discuss.    I advised pt to set up a visit with [facility] to discuss new health issues as it would be easier for [facility].    patient agreed and was transferred to scheduling.

## 2022-03-30 ENCOUNTER — DOCUMENTATION ONLY (OUTPATIENT)
Dept: OTHER | Facility: CLINIC | Age: 72
End: 2022-03-30
Payer: MEDICARE

## 2022-04-24 ENCOUNTER — HEALTH MAINTENANCE LETTER (OUTPATIENT)
Age: 72
End: 2022-04-24

## 2022-04-25 ENCOUNTER — TELEPHONE (OUTPATIENT)
Dept: FAMILY MEDICINE | Facility: CLINIC | Age: 72
End: 2022-04-25
Payer: MEDICARE

## 2022-04-25 NOTE — TELEPHONE ENCOUNTER
Patient called to make an appointment for a post ED visit to follow up with Diverticulitis diagnosis.  She stated if we have to wear masks then she will not come to this clinic.  I let her know that they were required, so she stated she will go elsewhere where there is no mask mandate. The patient wanted to give you the message.

## 2022-11-19 ENCOUNTER — HEALTH MAINTENANCE LETTER (OUTPATIENT)
Age: 72
End: 2022-11-19

## 2023-06-01 ENCOUNTER — HEALTH MAINTENANCE LETTER (OUTPATIENT)
Age: 73
End: 2023-06-01

## 2023-11-18 ENCOUNTER — HEALTH MAINTENANCE LETTER (OUTPATIENT)
Age: 73
End: 2023-11-18